# Patient Record
Sex: FEMALE | Race: WHITE | NOT HISPANIC OR LATINO | Employment: UNEMPLOYED | URBAN - METROPOLITAN AREA
[De-identification: names, ages, dates, MRNs, and addresses within clinical notes are randomized per-mention and may not be internally consistent; named-entity substitution may affect disease eponyms.]

---

## 2022-10-11 ENCOUNTER — OFFICE VISIT (OUTPATIENT)
Dept: OBGYN CLINIC | Facility: CLINIC | Age: 22
End: 2022-10-11

## 2022-10-11 VITALS
WEIGHT: 120.2 LBS | BODY MASS INDEX: 20.03 KG/M2 | DIASTOLIC BLOOD PRESSURE: 74 MMHG | SYSTOLIC BLOOD PRESSURE: 112 MMHG | HEIGHT: 65 IN

## 2022-10-11 DIAGNOSIS — Z01.419 GYNECOLOGIC EXAM NORMAL: Primary | ICD-10-CM

## 2022-10-11 DIAGNOSIS — Z30.41 ENCOUNTER FOR SURVEILLANCE OF CONTRACEPTIVE PILLS: ICD-10-CM

## 2022-10-11 PROCEDURE — G0145 SCR C/V CYTO,THINLAYER,RESCR: HCPCS | Performed by: PHYSICIAN ASSISTANT

## 2022-10-11 RX ORDER — NORETHINDRONE ACETATE AND ETHINYL ESTRADIOL 1MG-20(21)
1 KIT ORAL DAILY
Qty: 28 TABLET | Refills: 2 | Status: SHIPPED | OUTPATIENT
Start: 2022-10-11

## 2022-10-11 RX ORDER — LEVONORGESTREL AND ETHINYL ESTRADIOL 0.1-0.02MG
KIT ORAL
COMMUNITY
Start: 2022-03-16 | End: 2022-10-11

## 2022-10-11 NOTE — PROGRESS NOTES
Assessment/Plan   Problem List Items Addressed This Visit        Other    Gynecologic exam normal - Primary     Pap guidelines reviewed  Pap with reflex done today  Reviewed birth control options, would like to switch to a different OCP  Reviewed when to start, what to do if misses pill  Recommend using condoms for the 1st month on the pill, if misses more than 2 pills in the pack, if on antibiotics and for STD prevention  Reviewed common side effects of the pill including nausea, vomiting, breast pain, bloating, fatigue, mood swings, weight gain, and increased acne  Reassured side effects typically diminish in the first month or two on the pill  Reviewed clotting risk and signs and symptoms of pulmonary embolism, DVT, myocardial infarction, stroke  Return to office in 3 months for pill check  Relevant Orders    Liquid-based pap, screening      Other Visit Diagnoses     Encounter for surveillance of contraceptive pills        Relevant Medications    norethindrone-ethinyl estradiol (Loestrin Fe 1/20) 1-20 MG-MCG per tablet          Subjective:     Patient ID: Claire Viera is a 20 y o  y o  female  HPI  21 yo seen for annual exam  Currently on [facility]va OCP, original on [facility] OCP for 6 months with no problems  2 months switched to [facility], menses was coming every 2 weeks (10 days early) and having a lot of mood swings on it  Denies bowel or bladder issues  Recently , monogamous, no STD concerns  Last pap: Reports have always been normal    The following portions of the patient's history were reviewed and updated as appropriate: She  has no past medical history on file  She   Patient Active Problem List    Diagnosis Date Noted   • Gynecologic exam normal 10/11/2022     She  has no past surgical history on file  Her family history is not on file  She  reports that she has never smoked  She has never used smokeless tobacco  She reports current alcohol use   She reports that she does not use drugs  Current Outpatient Medications   Medication Sig Dispense Refill   • norethindrone-ethinyl estradiol (Loestrin Fe ) 1-20 MG-MCG per tablet Take 1 tablet by mouth daily 28 tablet 2     No current facility-administered medications for this visit  She has No Known Allergies       Menstrual History:  OB History             Para        Term                AB        Living   0       SAB        IAB        Ectopic        Multiple        Live Births                      Patient's last menstrual period was 10/02/2022 (approximate)  Review of Systems   Constitutional: Negative for fatigue, fever and unexpected weight change  HENT: Negative for dental problem and sinus pressure  Eyes: Negative for visual disturbance  Respiratory: Negative for cough, shortness of breath and wheezing  Cardiovascular: Negative for chest pain  Gastrointestinal: Negative for abdominal pain, blood in stool, constipation, diarrhea, nausea and vomiting  Endocrine: Negative for polydipsia  Genitourinary: Positive for menstrual problem  Negative for difficulty urinating, dyspareunia, dysuria, frequency, hematuria, pelvic pain and urgency  Musculoskeletal: Negative for arthralgias and back pain  Neurological: Negative for dizziness, seizures, light-headedness and headaches  Psychiatric/Behavioral: Negative for suicidal ideas  The patient is not nervous/anxious  Objective:  Vitals:    10/11/22 1504   BP: 112/74   BP Location: Left arm   Patient Position: Sitting   Cuff Size: Standard   Weight: 54 5 kg (120 lb 3 2 oz)   Height: 5' 5" (1 651 m)      Physical Exam  Constitutional:       Appearance: Normal appearance  She is well-developed  Genitourinary:      Vulva and bladder normal       No lesions in the vagina  Right Labia: No rash, tenderness, lesions or skin changes  Left Labia: No tenderness, lesions, skin changes or rash  No labial fusion noted        No inguinal adenopathy present in the right or left side  No vaginal discharge, erythema, tenderness or bleeding  Right Adnexa: not tender, not full and no mass present  Left Adnexa: not tender, not full and no mass present  No cervical motion tenderness, discharge or lesion  Uterus is not enlarged, tender or irregular  No uterine mass detected  No urethral prolapse, tenderness or mass present  Bladder is not tender  Breasts: Breasts are symmetrical       Right: No swelling, bleeding, inverted nipple, mass, nipple discharge, skin change, tenderness, axillary adenopathy or supraclavicular adenopathy  Left: No swelling, bleeding, inverted nipple, mass, nipple discharge, skin change, tenderness, axillary adenopathy or supraclavicular adenopathy  HENT:      Head: Normocephalic and atraumatic  Neck:      Thyroid: No thyromegaly  Cardiovascular:      Rate and Rhythm: Normal rate and regular rhythm  Heart sounds: Normal heart sounds  No murmur heard  No friction rub  No gallop  Pulmonary:      Effort: Pulmonary effort is normal  No respiratory distress  Breath sounds: Normal breath sounds  No wheezing  Abdominal:      General: There is no distension  Palpations: Abdomen is soft  There is no mass  Tenderness: There is no abdominal tenderness  There is no guarding or rebound  Hernia: No hernia is present  Lymphadenopathy:      Cervical: No cervical adenopathy  Upper Body:      Right upper body: No supraclavicular, axillary or pectoral adenopathy  Left upper body: No supraclavicular, axillary or pectoral adenopathy  Lower Body: No right inguinal adenopathy  No left inguinal adenopathy  Neurological:      Mental Status: She is alert and oriented to person, place, and time  Skin:     General: Skin is warm and dry     Psychiatric:         Behavior: Behavior normal

## 2022-10-11 NOTE — ASSESSMENT & PLAN NOTE
Pap guidelines reviewed  Pap with reflex done today  Reviewed birth control options, would like to switch to a different OCP  Reviewed when to start, what to do if misses pill  Recommend using condoms for the 1st month on the pill, if misses more than 2 pills in the pack, if on antibiotics and for STD prevention  Reviewed common side effects of the pill including nausea, vomiting, breast pain, bloating, fatigue, mood swings, weight gain, and increased acne  Reassured side effects typically diminish in the first month or two on the pill  Reviewed clotting risk and signs and symptoms of pulmonary embolism, DVT, myocardial infarction, stroke  Return to office in 3 months for pill check

## 2022-10-18 LAB
LAB AP GYN PRIMARY INTERPRETATION: NORMAL
LAB AP LMP: NORMAL
Lab: NORMAL

## 2022-11-29 DIAGNOSIS — R45.86 MOOD SWINGS: ICD-10-CM

## 2022-11-29 DIAGNOSIS — R53.83 OTHER FATIGUE: Primary | ICD-10-CM

## 2022-11-30 ENCOUNTER — APPOINTMENT (OUTPATIENT)
Dept: LAB | Facility: CLINIC | Age: 22
End: 2022-11-30

## 2022-11-30 DIAGNOSIS — R53.83 OTHER FATIGUE: ICD-10-CM

## 2022-11-30 DIAGNOSIS — R45.86 MOOD SWINGS: ICD-10-CM

## 2022-11-30 LAB
BASOPHILS # BLD AUTO: 0.03 THOUSANDS/ÂΜL (ref 0–0.1)
BASOPHILS NFR BLD AUTO: 1 % (ref 0–1)
EOSINOPHIL # BLD AUTO: 0.1 THOUSAND/ÂΜL (ref 0–0.61)
EOSINOPHIL NFR BLD AUTO: 2 % (ref 0–6)
ERYTHROCYTE [DISTWIDTH] IN BLOOD BY AUTOMATED COUNT: 11.6 % (ref 11.6–15.1)
HCT VFR BLD AUTO: 39.3 % (ref 34.8–46.1)
HGB BLD-MCNC: 13 G/DL (ref 11.5–15.4)
IMM GRANULOCYTES # BLD AUTO: 0.01 THOUSAND/UL (ref 0–0.2)
IMM GRANULOCYTES NFR BLD AUTO: 0 % (ref 0–2)
LYMPHOCYTES # BLD AUTO: 1.74 THOUSANDS/ÂΜL (ref 0.6–4.47)
LYMPHOCYTES NFR BLD AUTO: 39 % (ref 14–44)
MCH RBC QN AUTO: 28.6 PG (ref 26.8–34.3)
MCHC RBC AUTO-ENTMCNC: 33.1 G/DL (ref 31.4–37.4)
MCV RBC AUTO: 87 FL (ref 82–98)
MONOCYTES # BLD AUTO: 0.37 THOUSAND/ÂΜL (ref 0.17–1.22)
MONOCYTES NFR BLD AUTO: 8 % (ref 4–12)
NEUTROPHILS # BLD AUTO: 2.26 THOUSANDS/ÂΜL (ref 1.85–7.62)
NEUTS SEG NFR BLD AUTO: 50 % (ref 43–75)
NRBC BLD AUTO-RTO: 0 /100 WBCS
PLATELET # BLD AUTO: 292 THOUSANDS/UL (ref 149–390)
PMV BLD AUTO: 11 FL (ref 8.9–12.7)
RBC # BLD AUTO: 4.54 MILLION/UL (ref 3.81–5.12)
WBC # BLD AUTO: 4.51 THOUSAND/UL (ref 4.31–10.16)

## 2022-12-01 LAB
ALBUMIN SERPL BCP-MCNC: 3.9 G/DL (ref 3.5–5)
ALP SERPL-CCNC: 40 U/L (ref 46–116)
ALT SERPL W P-5'-P-CCNC: 18 U/L (ref 12–78)
ANION GAP SERPL CALCULATED.3IONS-SCNC: 4 MMOL/L (ref 4–13)
AST SERPL W P-5'-P-CCNC: 17 U/L (ref 5–45)
BILIRUB SERPL-MCNC: 0.38 MG/DL (ref 0.2–1)
BUN SERPL-MCNC: 12 MG/DL (ref 5–25)
CALCIUM SERPL-MCNC: 9.6 MG/DL (ref 8.3–10.1)
CHLORIDE SERPL-SCNC: 106 MMOL/L (ref 96–108)
CO2 SERPL-SCNC: 26 MMOL/L (ref 21–32)
CREAT SERPL-MCNC: 0.74 MG/DL (ref 0.6–1.3)
FERRITIN SERPL-MCNC: 7 NG/ML (ref 8–388)
FOLATE SERPL-MCNC: >20 NG/ML (ref 3.1–17.5)
GFR SERPL CREATININE-BSD FRML MDRD: 114 ML/MIN/1.73SQ M
GLUCOSE SERPL-MCNC: 92 MG/DL (ref 65–140)
IRON SERPL-MCNC: 68 UG/DL (ref 50–170)
POTASSIUM SERPL-SCNC: 4 MMOL/L (ref 3.5–5.3)
PROT SERPL-MCNC: 7.3 G/DL (ref 6.4–8.4)
SODIUM SERPL-SCNC: 136 MMOL/L (ref 135–147)
T4 FREE SERPL-MCNC: 0.97 NG/DL (ref 0.76–1.46)
TSH SERPL DL<=0.05 MIU/L-ACNC: 2.38 UIU/ML (ref 0.45–4.5)
VIT B12 SERPL-MCNC: 291 PG/ML (ref 100–900)

## 2022-12-10 PROBLEM — Z01.419 GYNECOLOGIC EXAM NORMAL: Status: RESOLVED | Noted: 2022-10-11 | Resolved: 2022-12-10

## 2023-03-24 ENCOUNTER — OFFICE VISIT (OUTPATIENT)
Dept: FAMILY MEDICINE CLINIC | Facility: CLINIC | Age: 23
End: 2023-03-24

## 2023-03-24 VITALS
HEART RATE: 116 BPM | SYSTOLIC BLOOD PRESSURE: 118 MMHG | HEIGHT: 65 IN | WEIGHT: 118.6 LBS | OXYGEN SATURATION: 99 % | BODY MASS INDEX: 19.76 KG/M2 | TEMPERATURE: 99.1 F | RESPIRATION RATE: 15 BRPM | DIASTOLIC BLOOD PRESSURE: 80 MMHG

## 2023-03-24 DIAGNOSIS — Z11.4 SCREENING FOR HIV (HUMAN IMMUNODEFICIENCY VIRUS): ICD-10-CM

## 2023-03-24 DIAGNOSIS — Z00.00 ANNUAL PHYSICAL EXAM: Primary | ICD-10-CM

## 2023-03-24 DIAGNOSIS — F90.2 ATTENTION DEFICIT HYPERACTIVITY DISORDER (ADHD), COMBINED TYPE: ICD-10-CM

## 2023-03-24 DIAGNOSIS — Z13.6 SCREENING FOR CARDIOVASCULAR CONDITION: ICD-10-CM

## 2023-03-24 DIAGNOSIS — Z13.1 SCREENING FOR DIABETES MELLITUS: ICD-10-CM

## 2023-03-24 DIAGNOSIS — Z11.59 NEED FOR HEPATITIS C SCREENING TEST: ICD-10-CM

## 2023-03-24 DIAGNOSIS — Z11.8 SCREENING FOR CHLAMYDIAL DISEASE: ICD-10-CM

## 2023-03-24 DIAGNOSIS — D50.8 IRON DEFICIENCY ANEMIA SECONDARY TO INADEQUATE DIETARY IRON INTAKE: ICD-10-CM

## 2023-03-24 DIAGNOSIS — Z23 ENCOUNTER FOR IMMUNIZATION: ICD-10-CM

## 2023-03-24 PROBLEM — F90.9 ADHD: Status: ACTIVE | Noted: 2023-01-24

## 2023-03-24 RX ORDER — DEXTROAMPHETAMINE SACCHARATE, AMPHETAMINE ASPARTATE MONOHYDRATE, DEXTROAMPHETAMINE SULFATE AND AMPHETAMINE SULFATE 2.5; 2.5; 2.5; 2.5 MG/1; MG/1; MG/1; MG/1
10 CAPSULE, EXTENDED RELEASE ORAL EVERY MORNING
COMMUNITY
Start: 2023-01-24

## 2023-03-24 NOTE — PROGRESS NOTES
[facility]    NAME: Henna Fuentes  AGE: 20 y o   SEX: female  : 2000     DATE: 3/24/2023     Assessment and Plan:     Problem List Items Addressed This Visit        Other    ADHD     Patient started on medication by previously provider  She is experiencing good results with medication and would like to continue  Will monitor closely          Relevant Medications    amphetamine-dextroamphetamine (ADDERALL XR) 10 MG 24 hr capsule    Other Relevant Orders    Lipid panel    Comprehensive metabolic panel    CBC and differential    TSH, 3rd generation with Free T4 reflex    UA (URINE) with reflex to Scope   Other Visit Diagnoses     Annual physical exam    -  Primary    Relevant Orders    Lipid panel    Comprehensive metabolic panel    CBC and differential    TSH, 3rd generation with Free T4 reflex    UA (URINE) with reflex to Scope    : HIV 1/2 AB/AG w Reflex [facility] for 2 yr old and above    Hepatitis C antibody    Chlamydia/GC amplified DNA by PCR    Iron Panel (Includes Ferritin, Iron Sat%, Iron, and TIBC)    Screening for diabetes mellitus        Relevant Orders    Lipid panel    Comprehensive metabolic panel    Screening for cardiovascular condition        Relevant Orders    Lipid panel    Comprehensive metabolic panel    Screening for HIV (human immunodeficiency virus)        Relevant Orders    : HIV 1/2 AB/AG w Reflex [facility] for 2 yr old and above    Need for hepatitis C screening test        Relevant Orders    Hepatitis C antibody    Iron deficiency anemia secondary to inadequate dietary iron intake        Relevant Orders    CBC and differential    Iron Panel (Includes Ferritin, Iron Sat%, Iron, and TIBC)    Screening for chlamydial disease        Relevant Orders    Chlamydia/GC amplified DNA by PCR    Encounter for immunization        Relevant Orders    TDAP VACCINE GREATER THAN OR EQUAL TO 6YO IM (Completed) Immunizations and preventive care screenings were discussed with patient today  Appropriate education was printed on patient's after visit summary  Counseling:  Alcohol/drug use: discussed moderation in alcohol intake, the recommendations for healthy alcohol use, and avoidance of illicit drug use  Dental Health: discussed importance of regular tooth brushing, flossing, and dental visits  Injury prevention: discussed safety/seat belts, safety helmets, smoke detectors, carbon dioxide detectors, and smoking near bedding or upholstery  Sexual health: discussed sexually transmitted diseases, partner selection, use of condoms, avoidance of unintended pregnancy, and contraceptive alternatives  · Exercise: the importance of regular exercise/physical activity was discussed  Recommend exercise 3-5 times per week for at least 30 minutes  Return in about 3 months (around 6/24/2023) for ADHD follow up/medication   Chief Complaint:     Chief Complaint   Patient presents with   • Establish Care     New patient check up      History of Present Illness:     Adult Annual Physical   Patient here for a comprehensive physical exam  She is a new patient   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[employer]  She recently got   Reports she is on adderall and has a dx of adhd  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  Diet and Physical Activity  · Diet/Nutrition: well balanced diet  · Exercise: moderate cardiovascular exercise  Depression Screening  PHQ-2/9 Depression Screening    Little interest or pleasure in doing things: 0 - not at all  Feeling down, depressed, or hopeless: 0 - not at all  PHQ-2 Score: 0  PHQ-2 Interpretation: Negative depression screen         /GYN Health  · Follows with gyn     Review of Systems:     Review of Systems   Constitutional: Negative for chills, fatigue and fever  HENT: Negative for congestion, postnasal drip, rhinorrhea and sinus pressure      Eyes: Negative for photophobia and visual disturbance  Respiratory: Negative for cough and shortness of breath  Cardiovascular: Negative for chest pain, palpitations and leg swelling  Gastrointestinal: Negative for abdominal pain, constipation, diarrhea, nausea and vomiting  Genitourinary: Negative for difficulty urinating and dysuria  Musculoskeletal: Negative for arthralgias and myalgias  Skin: Negative for color change and rash  Neurological: Negative for dizziness, weakness, light-headedness and headaches  Psychiatric/Behavioral: Positive for decreased concentration  Past Medical History:     History reviewed  No pertinent past medical history  Past Surgical History:     History reviewed  No pertinent surgical history  Social History:     Social History     Socioeconomic History   • Marital status: /Civil Union     Spouse name: None   • Number of children: None   • Years of education: None   • Highest education level: None   Occupational History   • None   Tobacco Use   • Smoking status: Never   • Smokeless tobacco: Never   Vaping Use   • Vaping Use: Never used   Substance and Sexual Activity   • Alcohol use: Yes     Comment: socially   • Drug use: Never   • Sexual activity: Yes     Partners: Male     Birth control/protection: OCP   Other Topics Concern   • None   Social History Narrative   • None     Social Determinants of Health     Financial Resource Strain: Not on file   Food Insecurity: Not on file   Transportation Needs: Not on file   Physical Activity: Not on file   Stress: Not on file   Social Connections: Not on file   Intimate Partner Violence: Not At Risk   • Fear of Current or Ex-Partner: No   • Emotionally Abused: No   • Physically Abused: No   • Sexually Abused: No   Housing Stability: Not on file      Family History:     History reviewed  No pertinent family history     Current Medications:     Current Outpatient Medications   Medication Sig Dispense Refill   • amphetamine-dextroamphetamine (ADDERALL XR) 10 MG 24 hr capsule 10 mg every morning       No current facility-administered medications for this visit  Allergies:     No Known Allergies   Physical Exam:     /80   Pulse (!) 116   Temp 99 1 °F (37 3 °C) (Tympanic)   Resp 15   Ht 5' 4 6" (1 641 m)   Wt 53 8 kg (118 lb 9 6 oz)   LMP 02/23/2023   SpO2 99%   BMI 19 98 kg/m²     Physical Exam  Constitutional:       General: She is not in acute distress  Appearance: Normal appearance  She is not ill-appearing, toxic-appearing or diaphoretic  HENT:      Head: Normocephalic and atraumatic  Right Ear: Tympanic membrane and ear canal normal       Left Ear: Tympanic membrane and ear canal normal       Nose: Nose normal  No congestion  Mouth/Throat:      Mouth: Mucous membranes are moist       Pharynx: Oropharynx is clear  No oropharyngeal exudate  Eyes:      Extraocular Movements: Extraocular movements intact  Conjunctiva/sclera: Conjunctivae normal       Pupils: Pupils are equal, round, and reactive to light  Cardiovascular:      Rate and Rhythm: Normal rate and regular rhythm  Pulses: Normal pulses  Heart sounds: No murmur heard  Pulmonary:      Effort: Pulmonary effort is normal       Breath sounds: Normal breath sounds  No wheezing, rhonchi or rales  Abdominal:      General: Bowel sounds are normal  There is no distension  Palpations: Abdomen is soft  Tenderness: There is no abdominal tenderness  Musculoskeletal:         General: No swelling or tenderness  Normal range of motion  Cervical back: Normal range of motion and neck supple  Skin:     General: Skin is warm and dry  Capillary Refill: Capillary refill takes less than 2 seconds  Neurological:      General: No focal deficit present  Mental Status: She is alert and oriented to person, place, and time  Cranial Nerves: No cranial nerve deficit     Psychiatric:         Mood and Affect: Mood normal          Behavior: Behavior normal          Thought Content:  Thought content normal           Rocio Maria DO   [facility]

## 2023-03-24 NOTE — ASSESSMENT & PLAN NOTE
Patient started on medication by previously provider  She is experiencing good results with medication and would like to continue  Will monitor closely

## 2023-03-31 ENCOUNTER — APPOINTMENT (OUTPATIENT)
Dept: LAB | Facility: CLINIC | Age: 23
End: 2023-03-31

## 2023-03-31 DIAGNOSIS — Z11.4 SCREENING FOR HIV (HUMAN IMMUNODEFICIENCY VIRUS): ICD-10-CM

## 2023-03-31 DIAGNOSIS — F90.2 ATTENTION DEFICIT HYPERACTIVITY DISORDER (ADHD), COMBINED TYPE: ICD-10-CM

## 2023-03-31 DIAGNOSIS — Z13.1 SCREENING FOR DIABETES MELLITUS: ICD-10-CM

## 2023-03-31 DIAGNOSIS — Z00.00 ANNUAL PHYSICAL EXAM: ICD-10-CM

## 2023-03-31 DIAGNOSIS — Z13.6 SCREENING FOR CARDIOVASCULAR CONDITION: ICD-10-CM

## 2023-03-31 DIAGNOSIS — Z11.8 SCREENING FOR CHLAMYDIAL DISEASE: ICD-10-CM

## 2023-03-31 DIAGNOSIS — Z11.59 NEED FOR HEPATITIS C SCREENING TEST: ICD-10-CM

## 2023-03-31 DIAGNOSIS — D50.8 IRON DEFICIENCY ANEMIA SECONDARY TO INADEQUATE DIETARY IRON INTAKE: ICD-10-CM

## 2023-03-31 LAB
BASOPHILS # BLD AUTO: 0.04 THOUSANDS/ÂΜL (ref 0–0.1)
BASOPHILS NFR BLD AUTO: 1 % (ref 0–1)
CHOLEST SERPL-MCNC: 183 MG/DL
EOSINOPHIL # BLD AUTO: 0.05 THOUSAND/ÂΜL (ref 0–0.61)
EOSINOPHIL NFR BLD AUTO: 1 % (ref 0–6)
ERYTHROCYTE [DISTWIDTH] IN BLOOD BY AUTOMATED COUNT: 11.7 % (ref 11.6–15.1)
HCT VFR BLD AUTO: 39 % (ref 34.8–46.1)
HDLC SERPL-MCNC: 59 MG/DL
HGB BLD-MCNC: 13.1 G/DL (ref 11.5–15.4)
IMM GRANULOCYTES # BLD AUTO: 0.01 THOUSAND/UL (ref 0–0.2)
IMM GRANULOCYTES NFR BLD AUTO: 0 % (ref 0–2)
LDLC SERPL CALC-MCNC: 110 MG/DL (ref 0–100)
LYMPHOCYTES # BLD AUTO: 1.48 THOUSANDS/ÂΜL (ref 0.6–4.47)
LYMPHOCYTES NFR BLD AUTO: 40 % (ref 14–44)
MCH RBC QN AUTO: 29.4 PG (ref 26.8–34.3)
MCHC RBC AUTO-ENTMCNC: 33.6 G/DL (ref 31.4–37.4)
MCV RBC AUTO: 88 FL (ref 82–98)
MONOCYTES # BLD AUTO: 0.25 THOUSAND/ÂΜL (ref 0.17–1.22)
MONOCYTES NFR BLD AUTO: 7 % (ref 4–12)
NEUTROPHILS # BLD AUTO: 1.85 THOUSANDS/ÂΜL (ref 1.85–7.62)
NEUTS SEG NFR BLD AUTO: 51 % (ref 43–75)
NONHDLC SERPL-MCNC: 124 MG/DL
NRBC BLD AUTO-RTO: 0 /100 WBCS
PLATELET # BLD AUTO: 274 THOUSANDS/UL (ref 149–390)
PMV BLD AUTO: 11.3 FL (ref 8.9–12.7)
RBC # BLD AUTO: 4.45 MILLION/UL (ref 3.81–5.12)
TRIGL SERPL-MCNC: 68 MG/DL
WBC # BLD AUTO: 3.68 THOUSAND/UL (ref 4.31–10.16)

## 2023-04-01 LAB
ALBUMIN SERPL BCP-MCNC: 4.1 G/DL (ref 3.5–5)
ALP SERPL-CCNC: 40 U/L (ref 46–116)
ALT SERPL W P-5'-P-CCNC: 20 U/L (ref 12–78)
ANION GAP SERPL CALCULATED.3IONS-SCNC: 1 MMOL/L (ref 4–13)
AST SERPL W P-5'-P-CCNC: 17 U/L (ref 5–45)
BACTERIA UR QL AUTO: ABNORMAL /HPF
BILIRUB SERPL-MCNC: 0.76 MG/DL (ref 0.2–1)
BILIRUB UR QL STRIP: NEGATIVE
BUN SERPL-MCNC: 11 MG/DL (ref 5–25)
CALCIUM SERPL-MCNC: 9.3 MG/DL (ref 8.3–10.1)
CHLORIDE SERPL-SCNC: 108 MMOL/L (ref 96–108)
CLARITY UR: CLEAR
CO2 SERPL-SCNC: 28 MMOL/L (ref 21–32)
COLOR UR: COLORLESS
CREAT SERPL-MCNC: 0.74 MG/DL (ref 0.6–1.3)
FERRITIN SERPL-MCNC: 19 NG/ML (ref 8–388)
GFR SERPL CREATININE-BSD FRML MDRD: 114 ML/MIN/1.73SQ M
GLUCOSE P FAST SERPL-MCNC: 80 MG/DL (ref 65–99)
GLUCOSE UR STRIP-MCNC: NEGATIVE MG/DL
HCV AB SER QL: NORMAL
HGB UR QL STRIP.AUTO: ABNORMAL
HIV 1+2 AB+HIV1 P24 AG SERPL QL IA: NORMAL
HIV 2 AB SERPL QL IA: NORMAL
HIV1 AB SERPL QL IA: NORMAL
HIV1 P24 AG SERPL QL IA: NORMAL
IRON SATN MFR SERPL: 35 % (ref 15–50)
IRON SERPL-MCNC: 117 UG/DL (ref 50–170)
KETONES UR STRIP-MCNC: NEGATIVE MG/DL
LEUKOCYTE ESTERASE UR QL STRIP: ABNORMAL
NITRITE UR QL STRIP: NEGATIVE
NON-SQ EPI CELLS URNS QL MICRO: ABNORMAL /HPF
PH UR STRIP.AUTO: 7 [PH]
POTASSIUM SERPL-SCNC: 4 MMOL/L (ref 3.5–5.3)
PROT SERPL-MCNC: 7.3 G/DL (ref 6.4–8.4)
PROT UR STRIP-MCNC: NEGATIVE MG/DL
RBC #/AREA URNS AUTO: ABNORMAL /HPF
SODIUM SERPL-SCNC: 137 MMOL/L (ref 135–147)
SP GR UR STRIP.AUTO: 1 (ref 1–1.03)
TIBC SERPL-MCNC: 333 UG/DL (ref 250–450)
TSH SERPL DL<=0.05 MIU/L-ACNC: 1.29 UIU/ML (ref 0.45–4.5)
UROBILINOGEN UR STRIP-ACNC: <2 MG/DL
WBC #/AREA URNS AUTO: ABNORMAL /HPF

## 2023-04-03 LAB
C TRACH DNA SPEC QL NAA+PROBE: NEGATIVE
N GONORRHOEA DNA SPEC QL NAA+PROBE: NEGATIVE

## 2023-04-07 DIAGNOSIS — F90.2 ATTENTION DEFICIT HYPERACTIVITY DISORDER (ADHD), COMBINED TYPE: Primary | ICD-10-CM

## 2023-04-07 RX ORDER — DEXTROAMPHETAMINE SACCHARATE, AMPHETAMINE ASPARTATE MONOHYDRATE, DEXTROAMPHETAMINE SULFATE AND AMPHETAMINE SULFATE 2.5; 2.5; 2.5; 2.5 MG/1; MG/1; MG/1; MG/1
10 CAPSULE, EXTENDED RELEASE ORAL EVERY MORNING
Qty: 30 CAPSULE | Refills: 0 | Status: SHIPPED | OUTPATIENT
Start: 2023-04-07

## 2023-04-07 NOTE — TELEPHONE ENCOUNTER
Pt reports she was able to locate a pharmacy that has [facility] in stock  [facility]

## 2023-05-30 DIAGNOSIS — F90.2 ATTENTION DEFICIT HYPERACTIVITY DISORDER (ADHD), COMBINED TYPE: ICD-10-CM

## 2023-05-30 RX ORDER — DEXTROAMPHETAMINE SACCHARATE, AMPHETAMINE ASPARTATE MONOHYDRATE, DEXTROAMPHETAMINE SULFATE AND AMPHETAMINE SULFATE 2.5; 2.5; 2.5; 2.5 MG/1; MG/1; MG/1; MG/1
10 CAPSULE, EXTENDED RELEASE ORAL EVERY MORNING
Qty: 30 CAPSULE | Refills: 0 | Status: SHIPPED | OUTPATIENT
Start: 2023-05-30

## 2023-06-30 ENCOUNTER — OFFICE VISIT (OUTPATIENT)
Dept: FAMILY MEDICINE CLINIC | Facility: CLINIC | Age: 23
End: 2023-06-30
Payer: COMMERCIAL

## 2023-06-30 VITALS
OXYGEN SATURATION: 98 % | DIASTOLIC BLOOD PRESSURE: 78 MMHG | TEMPERATURE: 99.2 F | HEART RATE: 104 BPM | HEIGHT: 65 IN | RESPIRATION RATE: 17 BRPM | WEIGHT: 116.2 LBS | SYSTOLIC BLOOD PRESSURE: 136 MMHG | BODY MASS INDEX: 19.36 KG/M2

## 2023-06-30 DIAGNOSIS — F90.2 ATTENTION DEFICIT HYPERACTIVITY DISORDER (ADHD), COMBINED TYPE: Primary | ICD-10-CM

## 2023-06-30 PROCEDURE — 99214 OFFICE O/P EST MOD 30 MIN: CPT | Performed by: FAMILY MEDICINE

## 2023-06-30 NOTE — PROGRESS NOTES
Perry Galvan 2000 female MRN: [id number]    Family Medicine Follow-up Visit    ASSESSMENT/PLAN  Problem List Items Addressed This Visit        Other    ADHD - Primary       Doing well on medication as prescribed  Labs stable  No concerns  Follow up for CP when due or sooner if needed          No future appointments  SUBJECTIVE  CC: ADHD (3 month follow up  Meds working well  )      HPI:  Perry Galvan is a 24 y o  female who presents for check up  She reports her ADHD is doing well taking her medication  Denies any side effects  HPI    Review of Systems   Constitutional: Negative for chills, fatigue and fever  HENT: Negative for congestion, postnasal drip, rhinorrhea and sinus pressure  Eyes: Negative for photophobia and visual disturbance  Respiratory: Negative for cough and shortness of breath  Cardiovascular: Negative for chest pain, palpitations and leg swelling  Gastrointestinal: Negative for abdominal pain, constipation, diarrhea, nausea and vomiting  Genitourinary: Negative for difficulty urinating and dysuria  Musculoskeletal: Negative for arthralgias and myalgias  Skin: Negative for color change and rash  Neurological: Negative for dizziness, weakness, light-headedness and headaches  Historical Information   The patient history was reviewed as follows:    No past medical history on file  No past surgical history on file    Family History   Problem Relation Age of Onset   • Breast cancer Paternal Grandmother       Social History   Social History     Substance and Sexual Activity   Alcohol Use Yes   • Alcohol/week: 1 0 standard drink of alcohol   • Types: 1 Standard drinks or equivalent per week    Comment: Occasional drinking in a social setting     Social History     Substance and Sexual Activity   Drug Use Never     Social History     Tobacco Use   Smoking Status Never   Smokeless Tobacco Never       Medications:     Current Outpatient Medications:   • "amphetamine-dextroamphetamine (ADDERALL XR) 10 MG 24 hr capsule, Take 1 capsule (10 mg total) by mouth every morning Max Daily Amount: 10 mg, Disp: 30 capsule, Rfl: 0  No Known Allergies    OBJECTIVE    Vitals:   Vitals:    06/30/23 1111   BP: 136/78   BP Location: Left arm   Patient Position: Sitting   Cuff Size: Standard   Pulse: 104   Resp: 17   Temp: 99 2 °F (37 3 °C)   TempSrc: Tympanic   SpO2: 98%   Weight: 52 7 kg (116 lb 3 2 oz)   Height: 5' 4 6\" (1 641 m)           Physical Exam  Constitutional:       Appearance: She is well-developed  HENT:      Head: Normocephalic and atraumatic  Eyes:      Extraocular Movements: Extraocular movements intact  Conjunctiva/sclera: Conjunctivae normal    Cardiovascular:      Rate and Rhythm: Normal rate and regular rhythm  Heart sounds: Normal heart sounds  Pulmonary:      Effort: Pulmonary effort is normal  No respiratory distress  Breath sounds: Normal breath sounds  No wheezing  Musculoskeletal:         General: No tenderness  Normal range of motion  Cervical back: Normal range of motion and neck supple  Skin:     General: Skin is warm and dry  Neurological:      Mental Status: She is alert and oriented to person, place, and time     Psychiatric:         Mood and Affect: Mood normal          Behavior: Behavior normal             Labs:        Doy Dancer, DO    6/30/2023      "

## 2023-07-21 DIAGNOSIS — F90.2 ATTENTION DEFICIT HYPERACTIVITY DISORDER (ADHD), COMBINED TYPE: ICD-10-CM

## 2023-07-21 RX ORDER — DEXTROAMPHETAMINE SACCHARATE, AMPHETAMINE ASPARTATE MONOHYDRATE, DEXTROAMPHETAMINE SULFATE AND AMPHETAMINE SULFATE 2.5; 2.5; 2.5; 2.5 MG/1; MG/1; MG/1; MG/1
10 CAPSULE, EXTENDED RELEASE ORAL EVERY MORNING
Qty: 30 CAPSULE | Refills: 0 | Status: SHIPPED | OUTPATIENT
Start: 2023-07-21

## 2023-07-21 NOTE — TELEPHONE ENCOUNTER
I will fill this prescription.   However, patient needs to complete controlled substance agreement as per [facility]. [facility]'s policy

## 2023-09-15 DIAGNOSIS — F90.2 ATTENTION DEFICIT HYPERACTIVITY DISORDER (ADHD), COMBINED TYPE: ICD-10-CM

## 2023-09-15 RX ORDER — DEXTROAMPHETAMINE SACCHARATE, AMPHETAMINE ASPARTATE MONOHYDRATE, DEXTROAMPHETAMINE SULFATE AND AMPHETAMINE SULFATE 2.5; 2.5; 2.5; 2.5 MG/1; MG/1; MG/1; MG/1
10 CAPSULE, EXTENDED RELEASE ORAL EVERY MORNING
Qty: 30 CAPSULE | Refills: 0 | Status: SHIPPED | OUTPATIENT
Start: 2023-09-15

## 2023-12-01 DIAGNOSIS — F90.2 ATTENTION DEFICIT HYPERACTIVITY DISORDER (ADHD), COMBINED TYPE: ICD-10-CM

## 2023-12-04 RX ORDER — DEXTROAMPHETAMINE SACCHARATE, AMPHETAMINE ASPARTATE MONOHYDRATE, DEXTROAMPHETAMINE SULFATE AND AMPHETAMINE SULFATE 2.5; 2.5; 2.5; 2.5 MG/1; MG/1; MG/1; MG/1
10 CAPSULE, EXTENDED RELEASE ORAL EVERY MORNING
Qty: 30 CAPSULE | Refills: 0 | Status: SHIPPED | OUTPATIENT
Start: 2023-12-04

## 2023-12-19 ENCOUNTER — ANNUAL EXAM (OUTPATIENT)
Dept: OBGYN CLINIC | Facility: CLINIC | Age: 23
End: 2023-12-19
Payer: COMMERCIAL

## 2023-12-19 VITALS
BODY MASS INDEX: 18.83 KG/M2 | WEIGHT: 113 LBS | SYSTOLIC BLOOD PRESSURE: 114 MMHG | DIASTOLIC BLOOD PRESSURE: 66 MMHG | HEIGHT: 65 IN

## 2023-12-19 DIAGNOSIS — Z01.419 GYNECOLOGIC EXAM NORMAL: Primary | ICD-10-CM

## 2023-12-19 PROCEDURE — S0612 ANNUAL GYNECOLOGICAL EXAMINA: HCPCS | Performed by: PHYSICIAN ASSISTANT

## 2023-12-19 NOTE — PROGRESS NOTES
Assessment/Plan   Problem List Items Addressed This Visit          Other    Gynecologic exam normal - Primary     Reviewed recommendation to start a prenatal vitamin with folic acid 400-800mcg 1-3 months prior to conception to decrease risk of neural tube defects such as spina bifida.   Reviewed pap guidelines. Pap deferred secondary to negative pap in .   Return to office for annual or as needed.             Subjective:     Patient ID: Bianca Hernández is a 23 y.o. y.o. female.    HPI  24 yo seen for annual exam. Patient stopped OCP secondary to bad side effects. At this time ok if pregnancy occurs. Denies bowel or bladder issues.   Last pap: 10/11/2022 NILM  The following portions of the patient's history were reviewed and updated as appropriate: She  has no past medical history on file.  She   Patient Active Problem List    Diagnosis Date Noted    Gynecologic exam normal 2023    ADHD 2023     She  has no past surgical history on file.  Her family history includes Breast cancer in her paternal grandmother.  She  reports that she has never smoked. She has never used smokeless tobacco. She reports current alcohol use of about 1.0 standard drink of alcohol per week. She reports that she does not use drugs.  Current Outpatient Medications   Medication Sig Dispense Refill    amphetamine-dextroamphetamine (ADDERALL XR) 10 MG 24 hr capsule Take 1 capsule (10 mg total) by mouth every morning Max Daily Amount: 10 mg 30 capsule 0     No current facility-administered medications for this visit.     She has No Known Allergies..    Menstrual History:  OB History          0    Para   0    Term   0       0    AB   0    Living   0         SAB   0    IAB   0    Ectopic   0    Multiple   0    Live Births   0                  Patient's last menstrual period was 2023 (approximate).         Review of Systems   Constitutional:  Negative for fatigue, fever and unexpected weight change.   HENT:   "Negative for dental problem and sinus pressure.    Eyes:  Negative for visual disturbance.   Respiratory:  Negative for cough, shortness of breath and wheezing.    Cardiovascular:  Negative for chest pain.   Gastrointestinal:  Negative for abdominal pain, blood in stool, constipation, diarrhea, nausea and vomiting.   Endocrine: Negative for polydipsia.   Genitourinary:  Negative for difficulty urinating, dyspareunia, dysuria, frequency, hematuria, pelvic pain and urgency.   Musculoskeletal:  Negative for arthralgias and back pain.   Neurological:  Negative for dizziness, seizures, light-headedness and headaches.   Psychiatric/Behavioral:  Negative for suicidal ideas. The patient is not nervous/anxious.        Objective:  Vitals:    12/19/23 0952   BP: 114/66   BP Location: Left arm   Patient Position: Sitting   Cuff Size: Standard   Weight: 51.3 kg (113 lb)   Height: 5' 4.6\" (1.641 m)      Physical Exam  Constitutional:       Appearance: Normal appearance. She is well-developed.   Genitourinary:      Vulva and bladder normal.      No lesions in the vagina.      Right Labia: No rash, tenderness, lesions or skin changes.     Left Labia: No tenderness, lesions, skin changes or rash.     No labial fusion noted.      No inguinal adenopathy present in the right or left side.     No vaginal discharge, erythema, tenderness or bleeding.        Right Adnexa: not tender, not full and no mass present.     Left Adnexa: not tender, not full and no mass present.     No cervical motion tenderness, discharge or lesion.      Uterus is not enlarged, tender or irregular.      No uterine mass detected.     No urethral prolapse, tenderness or mass present.      Bladder is not tender.    Breasts:     Breasts are symmetrical.      Right: No swelling, bleeding, inverted nipple, mass, nipple discharge, skin change or tenderness.      Left: No swelling, bleeding, inverted nipple, mass, nipple discharge, skin change or tenderness.   HENT:      " Head: Normocephalic and atraumatic.   Neck:      Thyroid: No thyromegaly.   Cardiovascular:      Rate and Rhythm: Normal rate and regular rhythm.      Heart sounds: Normal heart sounds. No murmur heard.     No friction rub. No gallop.   Pulmonary:      Effort: Pulmonary effort is normal. No respiratory distress.      Breath sounds: Normal breath sounds. No wheezing.   Abdominal:      General: There is no distension.      Palpations: Abdomen is soft. There is no mass.      Tenderness: There is no abdominal tenderness. There is no guarding or rebound.      Hernia: No hernia is present.   Lymphadenopathy:      Cervical: No cervical adenopathy.      Upper Body:      Right upper body: No supraclavicular, axillary or pectoral adenopathy.      Left upper body: No supraclavicular, axillary or pectoral adenopathy.      Lower Body: No right inguinal adenopathy. No left inguinal adenopathy.   Neurological:      Mental Status: She is alert and oriented to person, place, and time.   Skin:     General: Skin is warm and dry.   Psychiatric:         Behavior: Behavior normal.

## 2023-12-19 NOTE — PATIENT INSTRUCTIONS
Reviewed recommendation to start a prenatal vitamin with folic acid 400-800mcg 1-3 months prior to conception to decrease risk of neural tube defects such as spina bifida.

## 2023-12-19 NOTE — ASSESSMENT & PLAN NOTE
Reviewed recommendation to start a prenatal vitamin with folic acid 400-800mcg 1-3 months prior to conception to decrease risk of neural tube defects such as spina bifida.   Reviewed pap guidelines. Pap deferred secondary to negative pap in 2022.   Return to office for annual or as needed.

## 2024-01-24 DIAGNOSIS — F90.2 ATTENTION DEFICIT HYPERACTIVITY DISORDER (ADHD), COMBINED TYPE: ICD-10-CM

## 2024-01-24 RX ORDER — DEXTROAMPHETAMINE SACCHARATE, AMPHETAMINE ASPARTATE MONOHYDRATE, DEXTROAMPHETAMINE SULFATE AND AMPHETAMINE SULFATE 2.5; 2.5; 2.5; 2.5 MG/1; MG/1; MG/1; MG/1
10 CAPSULE, EXTENDED RELEASE ORAL EVERY MORNING
Qty: 30 CAPSULE | Refills: 0 | Status: SHIPPED | OUTPATIENT
Start: 2024-01-24

## 2024-02-17 PROBLEM — Z01.419 GYNECOLOGIC EXAM NORMAL: Status: RESOLVED | Noted: 2023-12-19 | Resolved: 2024-02-17

## 2024-03-12 DIAGNOSIS — F90.2 ATTENTION DEFICIT HYPERACTIVITY DISORDER (ADHD), COMBINED TYPE: ICD-10-CM

## 2024-03-12 RX ORDER — DEXTROAMPHETAMINE SACCHARATE, AMPHETAMINE ASPARTATE MONOHYDRATE, DEXTROAMPHETAMINE SULFATE AND AMPHETAMINE SULFATE 2.5; 2.5; 2.5; 2.5 MG/1; MG/1; MG/1; MG/1
10 CAPSULE, EXTENDED RELEASE ORAL EVERY MORNING
Qty: 30 CAPSULE | Refills: 0 | Status: SHIPPED | OUTPATIENT
Start: 2024-03-12

## 2024-04-16 ENCOUNTER — OFFICE VISIT (OUTPATIENT)
Dept: FAMILY MEDICINE CLINIC | Facility: CLINIC | Age: 24
End: 2024-04-16
Payer: COMMERCIAL

## 2024-04-16 VITALS
HEIGHT: 65 IN | SYSTOLIC BLOOD PRESSURE: 128 MMHG | WEIGHT: 115.8 LBS | HEART RATE: 102 BPM | DIASTOLIC BLOOD PRESSURE: 78 MMHG | BODY MASS INDEX: 19.29 KG/M2 | TEMPERATURE: 99.1 F | OXYGEN SATURATION: 100 % | RESPIRATION RATE: 16 BRPM

## 2024-04-16 DIAGNOSIS — Z13.1 SCREENING FOR DIABETES MELLITUS: ICD-10-CM

## 2024-04-16 DIAGNOSIS — F90.2 ATTENTION DEFICIT HYPERACTIVITY DISORDER (ADHD), COMBINED TYPE: ICD-10-CM

## 2024-04-16 DIAGNOSIS — Z00.00 ANNUAL PHYSICAL EXAM: Primary | ICD-10-CM

## 2024-04-16 DIAGNOSIS — Z79.899 CONTROLLED SUBSTANCE AGREEMENT SIGNED: ICD-10-CM

## 2024-04-16 DIAGNOSIS — Z13.6 SCREENING FOR CARDIOVASCULAR CONDITION: ICD-10-CM

## 2024-04-16 PROCEDURE — 99395 PREV VISIT EST AGE 18-39: CPT | Performed by: FAMILY MEDICINE

## 2024-04-16 RX ORDER — DEXTROAMPHETAMINE SACCHARATE, AMPHETAMINE ASPARTATE MONOHYDRATE, DEXTROAMPHETAMINE SULFATE AND AMPHETAMINE SULFATE 2.5; 2.5; 2.5; 2.5 MG/1; MG/1; MG/1; MG/1
10 CAPSULE, EXTENDED RELEASE ORAL EVERY MORNING
Qty: 30 CAPSULE | Refills: 0 | Status: SHIPPED | OUTPATIENT
Start: 2024-04-16

## 2024-04-16 NOTE — ASSESSMENT & PLAN NOTE
She is well controlled on adderall 10 mg daily  PDMP reviewed  Controlled substance agreement signed

## 2024-04-16 NOTE — PROGRESS NOTES
ADULT ANNUAL PHYSICAL  [facility] PRACT[facility]    NAME: Bianca Hernández  AGE: 23 y.o. SEX: female  : 2000     DATE: 2024     Assessment and Plan:     Problem List Items Addressed This Visit          Behavioral Health    ADHD     She is well controlled on adderall 10 mg daily  PDMP reviewed  Controlled substance agreement signed         Relevant Medications    amphetamine-dextroamphetamine (ADDERALL XR) 10 MG 24 hr capsule    Other Relevant Orders    Lipid panel    Comprehensive metabolic panel    CBC and differential    TSH, 3rd generation with Free T4 reflex    UA (URINE) with reflex to Scope    Iron Panel (Includes Ferritin, Iron Sat%, Iron, and TIBC)       Other    Controlled substance agreement signed     Other Visit Diagnoses       Annual physical exam    -  Primary    Relevant Orders    Lipid panel    Comprehensive metabolic panel    CBC and differential    TSH, 3rd generation with Free T4 reflex    UA (URINE) with reflex to Scope    Iron Panel (Includes Ferritin, Iron Sat%, Iron, and TIBC)    Screening for diabetes mellitus        Relevant Orders    Comprehensive metabolic panel    Screening for cardiovascular condition        Relevant Orders    Lipid panel            Immunizations and preventive care screenings were discussed with patient today. Appropriate education was printed on patient's after visit summary.    Counseling:  Alcohol/drug use: discussed moderation in alcohol intake, the recommendations for healthy alcohol use, and avoidance of illicit drug use.  Dental Health: discussed importance of regular tooth brushing, flossing, and dental visits.  Injury prevention: discussed safety/seat belts, safety helmets, smoke detectors, carbon dioxide detectors, and smoking near bedding or upholstery.  Sexual health: discussed sexually transmitted diseases, partner selection, use of condoms, avoidance of unintended pregnancy, and contraceptive  alternatives.  Exercise: the importance of regular exercise/physical activity was discussed. Recommend exercise 3-5 times per week for at least 30 minutes.          Return in about 6 months (around 10/16/2024) for medication check.     Chief Complaint:     Chief Complaint   Patient presents with    Annual Exam      History of Present Illness:     Adult Annual Physical   Patient here for a comprehensive physical exam. The patient reports no problems.    Diet and Physical Activity  Diet/Nutrition: well balanced diet.   Exercise: moderate cardiovascular exercise.      Depression Screening  PHQ-2/9 Depression Screening    Little interest or pleasure in doing things: 0 - not at all  Feeling down, depressed, or hopeless: 0 - not at all  PHQ-2 Score: 0  PHQ-2 Interpretation: Negative depression screen         /GYN Health  Follows with gyn        Review of Systems:     Review of Systems   Constitutional:  Negative for chills, fatigue and fever.   HENT:  Negative for congestion, postnasal drip, rhinorrhea and sinus pressure.    Eyes:  Negative for photophobia and visual disturbance.   Respiratory:  Negative for cough and shortness of breath.    Cardiovascular:  Negative for chest pain, palpitations and leg swelling.   Gastrointestinal:  Negative for abdominal pain, constipation, diarrhea, nausea and vomiting.   Genitourinary:  Negative for difficulty urinating and dysuria.   Musculoskeletal:  Negative for arthralgias and myalgias.   Skin:  Negative for color change and rash.   Neurological:  Negative for dizziness, weakness, light-headedness and headaches.      Past Medical History:     History reviewed. No pertinent past medical history.   Past Surgical History:     History reviewed. No pertinent surgical history.   Social History:     Social History     Socioeconomic History    Marital status: /Civil Union     Spouse name: None    Number of children: None    Years of education: None    Highest education level:  "None   Occupational History    None   Tobacco Use    Smoking status: Never    Smokeless tobacco: Never   Vaping Use    Vaping status: Never Used   Substance and Sexual Activity    Alcohol use: Yes     Alcohol/week: 1.0 standard drink of alcohol     Types: 1 Standard drinks or equivalent per week     Comment: Occasional drinking in a social setting    Drug use: Never    Sexual activity: Yes     Partners: Male     Birth control/protection: Condom, Condom Male   Other Topics Concern    None   Social History Narrative    None     Social Determinants of Health     Financial Resource Strain: Not on file   Food Insecurity: Not on file   Transportation Needs: Not on file   Physical Activity: Not on file   Stress: Not on file   Social Connections: Not on file   Intimate Partner Violence: Not At Risk (3/24/2023)    Humiliation, Afraid, Rape, and Kick questionnaire     Fear of Current or Ex-Partner: No     Emotionally Abused: No     Physically Abused: No     Sexually Abused: No   Housing Stability: Not on file      Family History:     Family History   Problem Relation Age of Onset    Breast cancer Paternal Grandmother       Current Medications:     Current Outpatient Medications   Medication Sig Dispense Refill    amphetamine-dextroamphetamine (ADDERALL XR) 10 MG 24 hr capsule Take 1 capsule (10 mg total) by mouth every morning Max Daily Amount: 10 mg 30 capsule 0     No current facility-administered medications for this visit.      Allergies:     No Known Allergies   Physical Exam:     /78 (BP Location: Left arm, Patient Position: Sitting, Cuff Size: Standard)   Pulse 102   Temp 99.1 °F (37.3 °C) (Tympanic)   Resp 16   Ht 5' 4.5\" (1.638 m)   Wt 52.5 kg (115 lb 12.8 oz)   LMP 04/15/2024 (Exact Date)   SpO2 100%   BMI 19.57 kg/m²     Physical Exam  Constitutional:       General: She is not in acute distress.     Appearance: Normal appearance. She is not ill-appearing, toxic-appearing or diaphoretic.   HENT:      " Head: Normocephalic and atraumatic.      Right Ear: Tympanic membrane and ear canal normal.      Left Ear: Tympanic membrane and ear canal normal.      Nose: Nose normal. No congestion.      Mouth/Throat:      Mouth: Mucous membranes are moist.      Pharynx: Oropharynx is clear. No oropharyngeal exudate.   Eyes:      Extraocular Movements: Extraocular movements intact.      Conjunctiva/sclera: Conjunctivae normal.      Pupils: Pupils are equal, round, and reactive to light.   Cardiovascular:      Rate and Rhythm: Normal rate and regular rhythm.      Pulses: Normal pulses.      Heart sounds: No murmur heard.  Pulmonary:      Effort: Pulmonary effort is normal.      Breath sounds: Normal breath sounds. No wheezing, rhonchi or rales.   Abdominal:      General: Bowel sounds are normal. There is no distension.      Palpations: Abdomen is soft.      Tenderness: There is no abdominal tenderness.   Musculoskeletal:         General: No swelling or tenderness. Normal range of motion.      Cervical back: Normal range of motion and neck supple.   Skin:     General: Skin is warm and dry.      Capillary Refill: Capillary refill takes less than 2 seconds.   Neurological:      General: No focal deficit present.      Mental Status: She is alert and oriented to person, place, and time.      Cranial Nerves: No cranial nerve deficit.   Psychiatric:         Mood and Affect: Mood normal.         Behavior: Behavior normal.         Thought Content: Thought content normal.          Columba Ramírez DO   [facility]

## 2024-04-17 ENCOUNTER — APPOINTMENT (OUTPATIENT)
Dept: LAB | Facility: CLINIC | Age: 24
End: 2024-04-17
Payer: COMMERCIAL

## 2024-04-17 DIAGNOSIS — Z00.00 ANNUAL PHYSICAL EXAM: ICD-10-CM

## 2024-04-17 DIAGNOSIS — Z13.1 SCREENING FOR DIABETES MELLITUS: ICD-10-CM

## 2024-04-17 DIAGNOSIS — F90.2 ATTENTION DEFICIT HYPERACTIVITY DISORDER (ADHD), COMBINED TYPE: ICD-10-CM

## 2024-04-17 DIAGNOSIS — Z13.6 SCREENING FOR CARDIOVASCULAR CONDITION: ICD-10-CM

## 2024-04-17 LAB
ALBUMIN SERPL BCP-MCNC: 4.3 G/DL (ref 3.5–5)
ALP SERPL-CCNC: 43 U/L (ref 34–104)
ALT SERPL W P-5'-P-CCNC: 10 U/L (ref 7–52)
ANION GAP SERPL CALCULATED.3IONS-SCNC: 10 MMOL/L (ref 4–13)
AST SERPL W P-5'-P-CCNC: 17 U/L (ref 13–39)
BACTERIA UR QL AUTO: NORMAL /HPF
BASOPHILS # BLD AUTO: 0.05 THOUSANDS/ÂΜL (ref 0–0.1)
BASOPHILS NFR BLD AUTO: 1 % (ref 0–1)
BILIRUB SERPL-MCNC: 0.65 MG/DL (ref 0.2–1)
BILIRUB UR QL STRIP: NEGATIVE
BUN SERPL-MCNC: 11 MG/DL (ref 5–25)
CALCIUM SERPL-MCNC: 9 MG/DL (ref 8.4–10.2)
CHLORIDE SERPL-SCNC: 106 MMOL/L (ref 96–108)
CHOLEST SERPL-MCNC: 161 MG/DL
CLARITY UR: CLEAR
CO2 SERPL-SCNC: 25 MMOL/L (ref 21–32)
COLOR UR: COLORLESS
CREAT SERPL-MCNC: 0.67 MG/DL (ref 0.6–1.3)
EOSINOPHIL # BLD AUTO: 0.05 THOUSAND/ÂΜL (ref 0–0.61)
EOSINOPHIL NFR BLD AUTO: 1 % (ref 0–6)
ERYTHROCYTE [DISTWIDTH] IN BLOOD BY AUTOMATED COUNT: 11.9 % (ref 11.6–15.1)
FERRITIN SERPL-MCNC: 13 NG/ML (ref 11–307)
GFR SERPL CREATININE-BSD FRML MDRD: 123 ML/MIN/1.73SQ M
GLUCOSE P FAST SERPL-MCNC: 92 MG/DL (ref 65–99)
GLUCOSE UR STRIP-MCNC: NEGATIVE MG/DL
HCT VFR BLD AUTO: 36.7 % (ref 34.8–46.1)
HDLC SERPL-MCNC: 57 MG/DL
HGB BLD-MCNC: 12.3 G/DL (ref 11.5–15.4)
HGB UR QL STRIP.AUTO: ABNORMAL
IMM GRANULOCYTES # BLD AUTO: 0.01 THOUSAND/UL (ref 0–0.2)
IMM GRANULOCYTES NFR BLD AUTO: 0 % (ref 0–2)
IRON SATN MFR SERPL: 25 % (ref 15–50)
IRON SERPL-MCNC: 87 UG/DL (ref 50–212)
KETONES UR STRIP-MCNC: NEGATIVE MG/DL
LDLC SERPL CALC-MCNC: 93 MG/DL (ref 0–100)
LEUKOCYTE ESTERASE UR QL STRIP: NEGATIVE
LYMPHOCYTES # BLD AUTO: 1.66 THOUSANDS/ÂΜL (ref 0.6–4.47)
LYMPHOCYTES NFR BLD AUTO: 48 % (ref 14–44)
MCH RBC QN AUTO: 29.2 PG (ref 26.8–34.3)
MCHC RBC AUTO-ENTMCNC: 33.5 G/DL (ref 31.4–37.4)
MCV RBC AUTO: 87 FL (ref 82–98)
MONOCYTES # BLD AUTO: 0.26 THOUSAND/ÂΜL (ref 0.17–1.22)
MONOCYTES NFR BLD AUTO: 7 % (ref 4–12)
NEUTROPHILS # BLD AUTO: 1.51 THOUSANDS/ÂΜL (ref 1.85–7.62)
NEUTS SEG NFR BLD AUTO: 43 % (ref 43–75)
NITRITE UR QL STRIP: NEGATIVE
NON-SQ EPI CELLS URNS QL MICRO: NORMAL /HPF
NONHDLC SERPL-MCNC: 104 MG/DL
NRBC BLD AUTO-RTO: 0 /100 WBCS
PH UR STRIP.AUTO: 6.5 [PH]
PLATELET # BLD AUTO: 245 THOUSANDS/UL (ref 149–390)
PMV BLD AUTO: 11.1 FL (ref 8.9–12.7)
POTASSIUM SERPL-SCNC: 3.8 MMOL/L (ref 3.5–5.3)
PROT SERPL-MCNC: 6.7 G/DL (ref 6.4–8.4)
PROT UR STRIP-MCNC: NEGATIVE MG/DL
RBC # BLD AUTO: 4.21 MILLION/UL (ref 3.81–5.12)
RBC #/AREA URNS AUTO: NORMAL /HPF
SODIUM SERPL-SCNC: 141 MMOL/L (ref 135–147)
SP GR UR STRIP.AUTO: 1.01 (ref 1–1.03)
TIBC SERPL-MCNC: 348 UG/DL (ref 250–450)
TRIGL SERPL-MCNC: 57 MG/DL
TSH SERPL DL<=0.05 MIU/L-ACNC: 1.63 UIU/ML (ref 0.45–4.5)
UIBC SERPL-MCNC: 261 UG/DL (ref 155–355)
UROBILINOGEN UR STRIP-ACNC: <2 MG/DL
WBC # BLD AUTO: 3.54 THOUSAND/UL (ref 4.31–10.16)
WBC #/AREA URNS AUTO: NORMAL /HPF

## 2024-04-17 PROCEDURE — 82728 ASSAY OF FERRITIN: CPT

## 2024-04-17 PROCEDURE — 85025 COMPLETE CBC W/AUTO DIFF WBC: CPT

## 2024-04-17 PROCEDURE — 83540 ASSAY OF IRON: CPT

## 2024-04-17 PROCEDURE — 81001 URINALYSIS AUTO W/SCOPE: CPT

## 2024-04-17 PROCEDURE — 83550 IRON BINDING TEST: CPT

## 2024-04-17 PROCEDURE — 80053 COMPREHEN METABOLIC PANEL: CPT

## 2024-04-17 PROCEDURE — 36415 COLL VENOUS BLD VENIPUNCTURE: CPT

## 2024-04-17 PROCEDURE — 84443 ASSAY THYROID STIM HORMONE: CPT

## 2024-04-17 PROCEDURE — 80061 LIPID PANEL: CPT

## 2024-04-23 DIAGNOSIS — D50.8 IRON DEFICIENCY ANEMIA SECONDARY TO INADEQUATE DIETARY IRON INTAKE: Primary | ICD-10-CM

## 2024-06-14 DIAGNOSIS — F90.2 ATTENTION DEFICIT HYPERACTIVITY DISORDER (ADHD), COMBINED TYPE: ICD-10-CM

## 2024-06-17 RX ORDER — DEXTROAMPHETAMINE SACCHARATE, AMPHETAMINE ASPARTATE MONOHYDRATE, DEXTROAMPHETAMINE SULFATE AND AMPHETAMINE SULFATE 2.5; 2.5; 2.5; 2.5 MG/1; MG/1; MG/1; MG/1
10 CAPSULE, EXTENDED RELEASE ORAL EVERY MORNING
Qty: 30 CAPSULE | Refills: 0 | Status: SHIPPED | OUTPATIENT
Start: 2024-06-17

## 2024-09-10 ENCOUNTER — ULTRASOUND (OUTPATIENT)
Dept: OBGYN CLINIC | Facility: CLINIC | Age: 24
End: 2024-09-10
Payer: COMMERCIAL

## 2024-09-10 VITALS
BODY MASS INDEX: 19.33 KG/M2 | WEIGHT: 116 LBS | DIASTOLIC BLOOD PRESSURE: 60 MMHG | HEIGHT: 65 IN | SYSTOLIC BLOOD PRESSURE: 134 MMHG

## 2024-09-10 DIAGNOSIS — Z3A.08 8 WEEKS GESTATION OF PREGNANCY: Primary | ICD-10-CM

## 2024-09-10 DIAGNOSIS — O36.80X0 PREGNANCY WITH UNCERTAIN FETAL VIABILITY, SINGLE OR UNSPECIFIED FETUS: ICD-10-CM

## 2024-09-10 PROCEDURE — 99213 OFFICE O/P EST LOW 20 MIN: CPT | Performed by: OBSTETRICS & GYNECOLOGY

## 2024-09-10 PROCEDURE — 76817 TRANSVAGINAL US OBSTETRIC: CPT | Performed by: OBSTETRICS & GYNECOLOGY

## 2024-09-10 NOTE — PROGRESS NOTES
"Pregnancy Confirmation Visit  [facility]  [address] [place] [address]    Assessment/Plan:  24 y.o.  presenting with missed menses.  Viable pregnancy 8w3d by LMP consistent with ultrasound today.  - Continue/start prenatal vitamin  - We reviewed her current medications and discussed which are safe to continue in pregnancy  - We briefly discussed options for aneuploidy screening, to be discussed further at the prenatal intake  - Schedule prenatal intake with RN and initial prenatal visit; prenatal labs will be ordered during the prenatal intake      Subjective:    CC: Missed period    Bianca Hernández is a 24 y.o.  who presents with missed menses.  Patient's last menstrual period was 2024.    Patient notes that this pregnancy was planned and desired.  She was not using contraception at the time of conception. She reports she is certain of her LMP and that she has regular menses. She has has no vaginal bleeding since her LMP.    Objective:  /60 (BP Location: Left arm, Patient Position: Sitting, Cuff Size: Standard)   Ht 5' 4.5\" (1.638 m)   Wt 52.6 kg (116 lb)   LMP 2024   BMI 19.60 kg/m²     Physical Exam:  General: Well appearing, no distress  CV: Regular rate  Respiratory: Unlabored breathing  Abdomen: Soft, nontender  Extremities: Without edema  Mood and Affect: Appropriate    Transvaginal Pelvic Ultrasound  Hawk IUP  Yolk sac: Present  Fetal Pole: Present  CRL consistent with EGA   Cardiac activity: Present   bpm  No adnexal masses appreciated   "

## 2024-09-11 ENCOUNTER — TELEPHONE (OUTPATIENT)
Age: 24
End: 2024-09-11

## 2024-09-19 NOTE — PATIENT INSTRUCTIONS
Mindi Hernández,     It was so nice getting to know you today. Remember if you have an urgent or time sensitive concern, please call the practice phone number so a clinical triage team member can review your symptoms and get in touch with our on call provider if necessary. If you have general questions or need help navigating our services please REPLY to this message so it comes directly to me and I will respond between other patients. If I am out of the office for any reason, another nurse navigator may reach out to help you. Our Pregnancy Essential Guide is a great online resource--please use the link below.     [facility] Pregnancy Essentials Guide  [facility] ([email or web address])     Again, Congratulations and Thank You for choosing [facility]. I look forward to helping you through this journey.

## 2024-09-19 NOTE — PROGRESS NOTES
OB INTAKE INTERVIEW  Patient is 24 y.o. who presents for OB intake at 10.2 wks  She is accompanied by  during this encounter  The father of her baby (Alex) is involved in the pregnancy and is 29 years old.      Last Menstrual Period: 2024 (Exact)  Reports her cycles are regular, every 28 days   Ultrasound: Measured 8 weeks 2 days on 09/10/2024  Estimated Date of Delivery: 2024 dating based on LMP     Signs/Symptoms of Pregnancy  Current pregnancy symptoms:   Nausea, no vomiting- Recommended to try to eat 5-6 small meals a day, increase protein with meals/snacks, in order to keep stomach full at all times. Try bland foods like plain crackers, toast as well as carbonated drinks like gingerale. Hard peppermint or ale candy, is a natural treatment for nausea,  B- pops  with B6 ( available at the pharmacy), B6 25 mg in the AM and 25 mg in PM. May combine with Unisom 12.5 mg at night. Unisom may cause drowsiness.  High complex carbohydrate snack  before bedtime. Constipation no  Headaches YES, occasional   Cramping/spotting YES, intermittent cramping, no spotting   PICA cravings no    Diabetes-  There is no height or weight on file to calculate BMI.  If patient has 1 or more, please order early 1 hour GTT  History of GDM no  BMI >35 no  History of PCOS or current metformin use no  History of LGA/macrosomic infant (4000g/9lbs) no    If patient has 2 or more, please order early 1 hour GTT  BMI>30 no  AMA no  First degree relative with type 2 diabetes no  History of chronic HTN, hyperlipidemia, elevated A1C no  High risk race (, , ,  or ) no    Hypertension- if you answer yes to any of the following, please order baseline preeclampsia labs (cbc, comprehensive metabolic panel, urine protein creatinine ratio, uric acid)  History of of chronic HTN no  History of gestational HTN no  History of preeclampsia, eclampsia, or HELLP syndrome  no  History of diabetes no  History of lupus, autoimmune disease, kidney disease no    Thyroid- if yes order TSH with reflex T4  History of thyroid disease no    Bleeding Disorder or Hx of DVT-patient or first degree relative with history of. Order the following if not done previously.   (Factor V, antithrombin III, prothrombin gene mutation, protein C and S Ag, lupus anticoagulant, anticardiolipin, beta-2 glycoprotein)   no    OB/GYN-  History of abnormal pap smear no       Date of last pap smear 10/11/2022-NILM  History of HPV no  History of Herpes/HSV no  History of other STI (gonorrhea, chlamydia, trich) no  History of prior  no  History of prior  no  History of  delivery prior to 36 weeks 6 days no  History of blood transfusion no  Ok for blood transfusion yes    Substance screening-   History of tobacco use no  Currently using tobacco no  Substance Use Screen Level No Risk     MRSA Screening-   Does the pt have a hx of MRSA? no    Immunizations:  Influenza vaccine given this season No  Discussed Tdap vaccine yes  Discussed COVID Vaccine yes    Genetic/MFM-  Do you or your partner have a history of any of the following in yourselves or first degree relatives?  Cystic fibrosis no  Spinal muscular atrophy no  Hemoglobinopathy/Sickle Cell/Thalassemia no  Fragile X Intellectual Disability no    If yes, discuss Carrier Screening and recommend consultation with MFM/Genetic Counseling and place specific [facility] Referral for.    If no, discuss Carrier Screening being completed once in a lifetime as a standard of care lab test. Place orders for Cystic Fibrosis Gene Test (GIV187) and Spinal Muscular Atrophy DNA ([id number])    Declined CF/SMA screening   Appointment for Nuchal Translucency Ultrasound at [facility] scheduled for  10/7/2024      Interview education  [facility] Pregnancy Essentials Book reviewed, discussed and attached to their AVS yes    Ambulatory Referral to  placed  EPDS:  0    Prenatal lab work scripts YES  Preferred Lab: [facility]   Extra labs ordered:  none    Aspirin/Preeclampsia Screen    Risk Level Risk Factor Recommendation   LOW Prior Uncomplicated full-term delivery no No Aspirin recommendation        MODERATE Nulliparity YES Recommend low-dose aspirin if     BMI>30 no 2 or more moderate risk factors    Family History Preeclampsia (mother/sister) no     35yr old or greater no     Black Race, Concern for SDOH/Low Socioeconomic no     IVF Pregnancy  no     Personal History Risks (low birth weight, prior adverse preg outcome, >10yr preg interval) no         HIGH History of Preeclampsia no Recommend low-dose aspirin if     Multifetal gestation no 1 or more high risk factors    Chronic HTN no     Type 1 or 2 Diabetes no     Renal Disease no     Autoimmune Disease  no      Contraindications to ASA therapy:  NSAID/ ASA allergy: no  Nasal polyps: no  Asthma with history of ASA induced bronchospasm: no  Relative contraindications:  History of GI bleed: no  Active peptic ulcer disease: no  Severe hepatic dysfunction: no    Patient should be recommended to take ASA 162mg during this pregnancy from 12-36wks to lower her risk of preeclampsia:   Low Risk      The patient has a history now or in prior pregnancy notable for:  Primigravida       Details that I feel the provider should be aware of:   Leslie is a current patient of [facility]. This is her first pregnancy. This is a planned and desired pregnancy.     Past Medical history includes:  ADHD-Stopped taking  Adderall 4 months ago. Follows PCP         PN1 visit scheduled. The patient was oriented to our practice, the navigator role, reviewed delivering physicians and [facility] for Delivery. All questions were answered.    Interviewed by: TRISTEN [place]

## 2024-09-23 ENCOUNTER — PATIENT OUTREACH (OUTPATIENT)
Dept: OBGYN CLINIC | Facility: CLINIC | Age: 24
End: 2024-09-23

## 2024-09-23 ENCOUNTER — INITIAL PRENATAL (OUTPATIENT)
Dept: OBGYN CLINIC | Facility: CLINIC | Age: 24
End: 2024-09-23

## 2024-09-23 VITALS
HEIGHT: 65 IN | WEIGHT: 116 LBS | BODY MASS INDEX: 19.33 KG/M2 | SYSTOLIC BLOOD PRESSURE: 118 MMHG | DIASTOLIC BLOOD PRESSURE: 60 MMHG

## 2024-09-23 DIAGNOSIS — Z31.430 ENCOUNTER OF FEMALE FOR TESTING FOR GENETIC DISEASE CARRIER STATUS FOR PROCREATIVE MANAGEMENT: ICD-10-CM

## 2024-09-23 DIAGNOSIS — Z34.01 ENCOUNTER FOR SUPERVISION OF NORMAL FIRST PREGNANCY IN FIRST TRIMESTER: Primary | ICD-10-CM

## 2024-09-23 DIAGNOSIS — Z3A.10 10 WEEKS GESTATION OF PREGNANCY: ICD-10-CM

## 2024-09-23 PROCEDURE — OBC: Performed by: NURSE PRACTITIONER

## 2024-09-23 NOTE — PROGRESS NOTES
SW referred for initial prenatal assessment. Patient is , [id number] with an ANTONI of 25. Patient presented with FOB ( Nelson) today, agreeable to speaking with SW.    Patient reports this is a planned pregnancy. She and FOB both drive. Patient is currently unemployed, FOB works full-time. Patient denies needing information for MA/WIC/SNAP, parenting education, or baby supply resources today.    Patient denies current or h/o substance use, DV/IPV, CYS involvement, and legal concerns. Reports h/o ADHD - was on medication through PCP but stopped for pregnancy. Denies interest in therapy at this time. Has good support from FOB, family, and friends. Enjoys watching TV, baking, and cooking for stress relief.    No SW needs identified at this time, SW closing referral. Please re-refer as needed.

## 2024-09-24 ENCOUNTER — APPOINTMENT (OUTPATIENT)
Dept: LAB | Facility: CLINIC | Age: 24
End: 2024-09-24
Payer: COMMERCIAL

## 2024-09-24 DIAGNOSIS — Z34.01 ENCOUNTER FOR SUPERVISION OF NORMAL FIRST PREGNANCY IN FIRST TRIMESTER: ICD-10-CM

## 2024-09-24 LAB
ABO GROUP BLD: NORMAL
BASOPHILS # BLD AUTO: 0.03 THOUSANDS/ΜL (ref 0–0.1)
BASOPHILS NFR BLD AUTO: 1 % (ref 0–1)
BILIRUB UR QL STRIP: NEGATIVE
BLD GP AB SCN SERPL QL: NEGATIVE
CLARITY UR: CLEAR
COLOR UR: NORMAL
EOSINOPHIL # BLD AUTO: 0.04 THOUSAND/ΜL (ref 0–0.61)
EOSINOPHIL NFR BLD AUTO: 1 % (ref 0–6)
ERYTHROCYTE [DISTWIDTH] IN BLOOD BY AUTOMATED COUNT: 12 % (ref 11.6–15.1)
GLUCOSE UR STRIP-MCNC: NEGATIVE MG/DL
HBV SURFACE AB SER-ACNC: 5.58 MIU/ML
HBV SURFACE AG SER QL: NORMAL
HCT VFR BLD AUTO: 34.1 % (ref 34.8–46.1)
HCV AB SER QL: NORMAL
HGB BLD-MCNC: 12.2 G/DL (ref 11.5–15.4)
HGB UR QL STRIP.AUTO: NEGATIVE
IMM GRANULOCYTES # BLD AUTO: 0.03 THOUSAND/UL (ref 0–0.2)
IMM GRANULOCYTES NFR BLD AUTO: 1 % (ref 0–2)
KETONES UR STRIP-MCNC: NEGATIVE MG/DL
LEUKOCYTE ESTERASE UR QL STRIP: NEGATIVE
LYMPHOCYTES # BLD AUTO: 1.44 THOUSANDS/ΜL (ref 0.6–4.47)
LYMPHOCYTES NFR BLD AUTO: 25 % (ref 14–44)
MCH RBC QN AUTO: 30.3 PG (ref 26.8–34.3)
MCHC RBC AUTO-ENTMCNC: 35.8 G/DL (ref 31.4–37.4)
MCV RBC AUTO: 85 FL (ref 82–98)
MONOCYTES # BLD AUTO: 0.43 THOUSAND/ΜL (ref 0.17–1.22)
MONOCYTES NFR BLD AUTO: 8 % (ref 4–12)
NEUTROPHILS # BLD AUTO: 3.69 THOUSANDS/ΜL (ref 1.85–7.62)
NEUTS SEG NFR BLD AUTO: 64 % (ref 43–75)
NITRITE UR QL STRIP: NEGATIVE
NRBC BLD AUTO-RTO: 0 /100 WBCS
PH UR STRIP.AUTO: 6.5 [PH]
PLATELET # BLD AUTO: 253 THOUSANDS/UL (ref 149–390)
PMV BLD AUTO: 11.5 FL (ref 8.9–12.7)
PROT UR STRIP-MCNC: NEGATIVE MG/DL
RBC # BLD AUTO: 4.02 MILLION/UL (ref 3.81–5.12)
RH BLD: POSITIVE
SP GR UR STRIP.AUTO: 1.01 (ref 1–1.03)
SPECIMEN EXPIRATION DATE: NORMAL
TREPONEMA PALLIDUM IGG+IGM AB [PRESENCE] IN SERUM OR PLASMA BY IMMUNOASSAY: NORMAL
UROBILINOGEN UR STRIP-ACNC: <2 MG/DL
WBC # BLD AUTO: 5.66 THOUSAND/UL (ref 4.31–10.16)

## 2024-09-24 PROCEDURE — 85025 COMPLETE CBC W/AUTO DIFF WBC: CPT

## 2024-09-24 PROCEDURE — 36415 COLL VENOUS BLD VENIPUNCTURE: CPT

## 2024-09-24 PROCEDURE — 86780 TREPONEMA PALLIDUM: CPT

## 2024-09-24 PROCEDURE — 86803 HEPATITIS C AB TEST: CPT

## 2024-09-24 PROCEDURE — 87340 HEPATITIS B SURFACE AG IA: CPT

## 2024-09-24 PROCEDURE — 81003 URINALYSIS AUTO W/O SCOPE: CPT

## 2024-09-24 PROCEDURE — 86850 RBC ANTIBODY SCREEN: CPT

## 2024-09-24 PROCEDURE — 86900 BLOOD TYPING SEROLOGIC ABO: CPT

## 2024-09-24 PROCEDURE — 87086 URINE CULTURE/COLONY COUNT: CPT

## 2024-09-24 PROCEDURE — 86762 RUBELLA ANTIBODY: CPT

## 2024-09-24 PROCEDURE — 86706 HEP B SURFACE ANTIBODY: CPT

## 2024-09-24 PROCEDURE — 87389 HIV-1 AG W/HIV-1&-2 AB AG IA: CPT

## 2024-09-24 PROCEDURE — 86901 BLOOD TYPING SEROLOGIC RH(D): CPT

## 2024-09-25 LAB
BACTERIA UR CULT: NORMAL
HIV 1+2 AB+HIV1 P24 AG SERPL QL IA: NORMAL
HIV 2 AB SERPL QL IA: NORMAL
HIV1 AB SERPL QL IA: NORMAL
HIV1 P24 AG SERPL QL IA: NORMAL
RUBV IGG SERPL IA-ACNC: 55.7 IU/ML

## 2024-10-07 ENCOUNTER — ROUTINE PRENATAL (OUTPATIENT)
Dept: PERINATAL CARE | Facility: OTHER | Age: 24
End: 2024-10-07
Payer: COMMERCIAL

## 2024-10-07 VITALS
DIASTOLIC BLOOD PRESSURE: 78 MMHG | SYSTOLIC BLOOD PRESSURE: 118 MMHG | HEART RATE: 106 BPM | HEIGHT: 65 IN | BODY MASS INDEX: 19.36 KG/M2 | WEIGHT: 116.2 LBS

## 2024-10-07 DIAGNOSIS — Z36.82 ENCOUNTER FOR ANTENATAL SCREENING FOR NUCHAL TRANSLUCENCY: Primary | ICD-10-CM

## 2024-10-07 DIAGNOSIS — Z3A.12 12 WEEKS GESTATION OF PREGNANCY: ICD-10-CM

## 2024-10-07 DIAGNOSIS — Z33.1 PREGNANT STATE, INCIDENTAL: ICD-10-CM

## 2024-10-07 DIAGNOSIS — Z36.0 ENCOUNTER FOR ANTENATAL SCREENING FOR CHROMOSOMAL ANOMALIES: ICD-10-CM

## 2024-10-07 DIAGNOSIS — Z3A.08 8 WEEKS GESTATION OF PREGNANCY: ICD-10-CM

## 2024-10-07 PROCEDURE — 36415 COLL VENOUS BLD VENIPUNCTURE: CPT | Performed by: OBSTETRICS & GYNECOLOGY

## 2024-10-07 PROCEDURE — 99243 OFF/OP CNSLTJ NEW/EST LOW 30: CPT | Performed by: OBSTETRICS & GYNECOLOGY

## 2024-10-07 PROCEDURE — 76813 OB US NUCHAL MEAS 1 GEST: CPT | Performed by: OBSTETRICS & GYNECOLOGY

## 2024-10-07 NOTE — PROGRESS NOTES
Patient chose to have [facility] [id number] Non-Invasive Prenatal Screen [id number] PLUS, NO SCA, WITH fetal sex  (per pt request).  Patient choose to be billed through insurance.     Patient given brochure and is aware [facility] will contact patient's insurance and coordinate coverage.  Provided [facility] contact information. General inquiries [phone], Cost estimates [phone] and [facility] Billing [phone]. Website [email or web address].     Blood collection tubes labeled with patient identifiers (name, medical record number, and date of birth).     Filled out [facility] order form. Patient chose to have blood drawn in our office at time of visit. NIPS was drawn from left arm with a butterfly needle by A Qian RAMON. .      If patient chose to have blood work drawn at a [facility] lab we requested patient notify MFM (via phone call or Savor message) when blood collected so office can follow up on results.       Maternal Fetal Medicine will have results in approximately 5-7 business days and will call patient or notify via Savor.  Patient aware viewing lab result online will reveal fetal sex if ordered.    Patient verbalized understanding of all instructions and no questions at this time.

## 2024-10-07 NOTE — LETTER
"   Date: 10/7/2024    Adilson Key V, DO  [address] [place] [address]  [address]  [facility] [id number]    Patient: Bianca Hernández   YOB: 2000   Date of Visit: 10/7/2024   Gestational age 12w5d   Nature of this communication: Routine       This patient was seen recently in our  office.  Please see ultrasound report under \"OB Procedures\" tab.  Please don't hesitate to contact our office with any concerns or questions.      Sincerely,      Savannah Freed MD  Attending Physician, Maternal-Fetal Medicine  [facility]      "

## 2024-10-10 NOTE — PROGRESS NOTES
"[facility]: Ms. Hernández was seen today for nuchal translucency ultrasound.  See ultrasound report under \"OB Procedures\" tab.      MDM:   I. Diagnoses/Problems addressed:  Self limited or minor problem: uncomplicated pregnancy  II.  Data: I ordered the following tests: NIPS.  III.  Risk of morbidity: Low    Please don't hesitate to contact our office with any concerns or questions.  -Savannah Freed MD      "

## 2024-10-11 LAB
CFDNA.FET/CFDNA.TOTAL SFR FETUS: NORMAL %
CITATION REF LAB TEST: NORMAL
FET 13+18+21+X+Y ANEUP PLAS.CFDNA: NEGATIVE
FET CHR 21 TS PLAS.CFDNA QL: NEGATIVE
FET CHR 21 TS PLAS.CFDNA QL: NEGATIVE
FET SEX PLAS.CFDNA DOSAGE CFDNA: NORMAL
FET TS 13 RISK PLAS.CFDNA QL: NEGATIVE
GA EST FROM CONCEPTION DATE: NORMAL D
GESTATIONAL AGE > 9:: YES
LAB DIRECTOR NAME PROVIDER: NORMAL
LAB DIRECTOR NAME PROVIDER: NORMAL
LABORATORY COMMENT REPORT: NORMAL
LIMITATIONS OF THE TEST: NORMAL
NEGATIVE PREDICTIVE VALUE: NORMAL
PERFORMANCE CHARACTERISTICS: NORMAL
POSITIVE PREDICTIVE VALUE: NORMAL
REF LAB TEST METHOD: NORMAL
SL AMB NOTE:: NORMAL
TEST PERFORMANCE INFO SPEC: NORMAL

## 2024-10-15 ENCOUNTER — INITIAL PRENATAL (OUTPATIENT)
Dept: OBGYN CLINIC | Facility: CLINIC | Age: 24
End: 2024-10-15
Payer: COMMERCIAL

## 2024-10-15 VITALS
WEIGHT: 118 LBS | SYSTOLIC BLOOD PRESSURE: 132 MMHG | DIASTOLIC BLOOD PRESSURE: 68 MMHG | BODY MASS INDEX: 19.66 KG/M2 | HEIGHT: 65 IN

## 2024-10-15 DIAGNOSIS — Z3A.13 13 WEEKS GESTATION OF PREGNANCY: Primary | ICD-10-CM

## 2024-10-15 DIAGNOSIS — Z36.1 NEED FOR MATERNAL SERUM ALPHA-PROTEIN (MSAFP) SCREENING: ICD-10-CM

## 2024-10-15 LAB
SL AMB  POCT GLUCOSE, UA: NORMAL
SL AMB POCT URINE PROTEIN: NORMAL

## 2024-10-15 PROCEDURE — PNV: Performed by: NURSE PRACTITIONER

## 2024-10-15 PROCEDURE — 87491 CHLMYD TRACH DNA AMP PROBE: CPT | Performed by: NURSE PRACTITIONER

## 2024-10-15 PROCEDURE — 81002 URINALYSIS NONAUTO W/O SCOPE: CPT | Performed by: NURSE PRACTITIONER

## 2024-10-15 PROCEDURE — 87591 N.GONORRHOEAE DNA AMP PROB: CPT | Performed by: NURSE PRACTITIONER

## 2024-10-15 NOTE — PROGRESS NOTES
"Routine Prenatal Visit  [facility] [place]  [address], [place] [address]    Assessment/Plan:  Bianca is a 24 y.o. year old  at 13w3d who presents for routine prenatal visit.     1. 13 weeks gestation of pregnancy  Assessment & Plan:  Reviewed normal prenatal labs, NIPT, NT  Discussed round ligament pain vs warning signs/when to call  Discussed AFP screening, timing/when to schedule  Anatomy scan scheduled with [facility]  Orders:  -     POCT urine dip  -     Chlamydia/GC amplified DNA by PCR  2. Need for maternal serum alpha-protein (MSAFP) screening  -     Alpha fetoprotein, maternal; Future      Next OB Visit 4 weeks.    Subjective:     CC: Prenatal care    Bianca Hernández is a 24 y.o.  female who presents for routine prenatal care at 13w3d. Feels well, no complaints. Mild nausea that is improving, no vomiting.   Pregnancy ROS: no leakage of fluid, pelvic pain, or vaginal bleeding.  No fetal movement yet    The following portions of the patient's history were reviewed and updated as appropriate: allergies, current medications, past family history, past medical history, obstetric history, gynecologic history, past social history, past surgical history and problem list.      Objective:  /68 (BP Location: Left arm, Patient Position: Sitting, Cuff Size: Standard)   Ht 5' 4.5\" (1.638 m)   Wt 53.5 kg (118 lb)   LMP 2024 (Exact Date)   BMI 19.94 kg/m²   Pregravid Weight/BMI: 52.6 kg (116 lb) (BMI 19.61)  Current Weight: 53.5 kg (118 lb)   Total Weight Gain: 0.907 kg (2 lb)   Pre-Tsering Vitals      Flowsheet Row Most Recent Value   Prenatal Assessment    Fetal Heart Rate 164   Prenatal Vitals    Blood Pressure 132/68   Weight - Scale 53.5 kg (118 lb)   Urine Albumin/Glucose    Dilation/Effacement/Station    Cervical Dilation 0   Cervical Effacement 0   Vaginal Drainage    Draining Fluid No   Edema    LLE Edema None   RLE Edema None   Facial Edema None           Prenatal physical " complete.  General: Well appearing, no distress  Abdomen: Soft, gravid, nontender  Extremities: Non tender.

## 2024-10-15 NOTE — ASSESSMENT & PLAN NOTE
Reviewed normal prenatal labs, NIPT, NT  Discussed round ligament pain vs warning signs/when to call  Discussed AFP screening, timing/when to schedule  Anatomy scan scheduled with M

## 2024-10-15 NOTE — PATIENT INSTRUCTIONS
Please go to the lab for your AFP screening for neural tube defect between 16-18 weeks  Call the office with any concerns

## 2024-10-16 LAB
C TRACH DNA SPEC QL NAA+PROBE: NEGATIVE
N GONORRHOEA DNA SPEC QL NAA+PROBE: NEGATIVE

## 2024-11-12 ENCOUNTER — ROUTINE PRENATAL (OUTPATIENT)
Dept: OBGYN CLINIC | Facility: CLINIC | Age: 24
End: 2024-11-12
Payer: COMMERCIAL

## 2024-11-12 VITALS
DIASTOLIC BLOOD PRESSURE: 70 MMHG | HEIGHT: 65 IN | SYSTOLIC BLOOD PRESSURE: 112 MMHG | WEIGHT: 122 LBS | BODY MASS INDEX: 20.33 KG/M2

## 2024-11-12 DIAGNOSIS — Z34.02 ENCOUNTER FOR SUPERVISION OF NORMAL FIRST PREGNANCY IN SECOND TRIMESTER: Primary | ICD-10-CM

## 2024-11-12 DIAGNOSIS — Z3A.17 17 WEEKS GESTATION OF PREGNANCY: ICD-10-CM

## 2024-11-12 PROBLEM — Z34.00 SUPERVISION OF NORMAL FIRST PREGNANCY: Status: ACTIVE | Noted: 2024-11-12

## 2024-11-12 LAB
SL AMB  POCT GLUCOSE, UA: NORMAL
SL AMB POCT URINE PROTEIN: NORMAL

## 2024-11-12 PROCEDURE — PNV: Performed by: STUDENT IN AN ORGANIZED HEALTH CARE EDUCATION/TRAINING PROGRAM

## 2024-11-12 PROCEDURE — 81002 URINALYSIS NONAUTO W/O SCOPE: CPT | Performed by: STUDENT IN AN ORGANIZED HEALTH CARE EDUCATION/TRAINING PROGRAM

## 2024-11-12 NOTE — ASSESSMENT & PLAN NOTE
- Continue prenatal vitamin with folic acid.  - Aneuploidy screening low risk.  - Screening for open neural tube defects reviewed. Order for maternal serum alpha-fetoprotein previously provided.  - Level II anatomy ultrasound scheduled with Maternal Fetal Medicine.  - Problem list updated, results console reviewed and updated with pertinent prenatal labs.  - PMH, PSH, medications reviewed and updated as needed.  - Return to office in 4 wk(s) for routine prenatal care.  Orders:    POCT urine dip

## 2024-11-12 NOTE — PROGRESS NOTES
"Routine Prenatal Visit  [facility] [place]  [address] [place] [address]  Assessment & Plan  Encounter for supervision of normal first pregnancy in second trimester         17 weeks gestation of pregnancy  - Continue prenatal vitamin with folic acid.  - Aneuploidy screening low risk.  - Screening for open neural tube defects reviewed. Order for maternal serum alpha-fetoprotein previously provided.  - Level II anatomy ultrasound scheduled with Maternal Fetal Medicine.  - Problem list updated, results console reviewed and updated with pertinent prenatal labs.  - PMH, PSH, medications reviewed and updated as needed.  - Return to office in 4 wk(s) for routine prenatal care.  Orders:    POCT urine dip    Subjective:   Bianca Hernández is a 24 y.o.  who presents for routine prenatal care at 17w3d.  Complaints today: None  LOF: No; VB: No; Contractions: No    Objective:  /70 (BP Location: Left arm, Patient Position: Sitting, Cuff Size: Standard)   Ht 5' 4.5\" (1.638 m)   Wt 55.3 kg (122 lb)   LMP 2024 (Exact Date)   BMI 20.62 kg/m²     General: Well appearing, no distress  Respiratory: Unlabored breathing  Cardiovascular: Regular rate.  Abdomen: Soft, gravid, nontender  Extremities: Warm and well perfused.  Non tender.    Pregravid Weight/BMI: 52.6 kg (116 lb) (BMI 19.61)  Current Weight: 55.3 kg (122 lb)   Total Weight Gain: 2.722 kg (6 lb)     Pre- Vitals      Flowsheet Row Most Recent Value   Prenatal Assessment    Fetal Heart Rate 155   Fundal Height (cm) 17 cm   Prenatal Vitals    Blood Pressure 112/70   Weight - Scale 55.3 kg (122 lb)   Urine Albumin/Glucose    Dilation/Effacement/Station    Vaginal Drainage    Draining Fluid No   Edema    LLE Edema None   RLE Edema None   Facial Edema None             Antonio Saldana MD  2024 11:12 AM   "

## 2024-11-25 ENCOUNTER — ROUTINE PRENATAL (OUTPATIENT)
Dept: PERINATAL CARE | Facility: CLINIC | Age: 24
End: 2024-11-25
Payer: COMMERCIAL

## 2024-11-25 VITALS
SYSTOLIC BLOOD PRESSURE: 124 MMHG | WEIGHT: 122.2 LBS | HEIGHT: 65 IN | HEART RATE: 95 BPM | BODY MASS INDEX: 20.36 KG/M2 | DIASTOLIC BLOOD PRESSURE: 70 MMHG

## 2024-11-25 DIAGNOSIS — Z3A.19 19 WEEKS GESTATION OF PREGNANCY: Primary | ICD-10-CM

## 2024-11-25 PROCEDURE — 76817 TRANSVAGINAL US OBSTETRIC: CPT | Performed by: OBSTETRICS & GYNECOLOGY

## 2024-11-25 PROCEDURE — 76805 OB US >/= 14 WKS SNGL FETUS: CPT | Performed by: OBSTETRICS & GYNECOLOGY

## 2024-11-25 PROCEDURE — 99213 OFFICE O/P EST LOW 20 MIN: CPT | Performed by: OBSTETRICS & GYNECOLOGY

## 2024-11-25 NOTE — PROGRESS NOTES
Ultrasound Probe Disinfection    A transvaginal ultrasound was performed.   Prior to use, disinfection was performed with High Level Disinfection Process ([employer]).  Probe serial number [id number] POLINA was used.    Montse Holguin  11/25/24  12:50 PM

## 2024-11-25 NOTE — LETTER
November 25, 2024     Adilson Key V,   [address]  [address]  [facility] [address]    Patient: Bianca Hernández   YOB: 2000   Date of Visit: 11/25/2024       Dear Dr. Key:    Thank you for referring Bianca Hernández to me for evaluation. Below are my notes for this consultation.    If you have questions, please do not hesitate to call me. I look forward to following your patient along with you.         Sincerely,        Darvin Rosario MD        CC: No Recipients    Darvin Rosario MD  11/25/2024  5:39 PM  Sign when Signing Visit  A fetal ultrasound was completed. See Ob procedures in Epic for an interpretation and recommendations. Do not hesitate to contact us in [facility] if you have questions.    Darvin Rosario MD, MSCE  Maternal Fetal Medicine

## 2024-11-25 NOTE — PROGRESS NOTES
A fetal ultrasound was completed. See Ob procedures in Epic for an interpretation and recommendations. Do not hesitate to contact us in [facility] if you have questions.    Darvin Rosario MD, MSCE  Maternal Fetal Medicine

## 2024-11-26 ENCOUNTER — APPOINTMENT (OUTPATIENT)
Dept: LAB | Facility: CLINIC | Age: 24
End: 2024-11-26
Payer: COMMERCIAL

## 2024-11-26 DIAGNOSIS — Z36.1 NEED FOR MATERNAL SERUM ALPHA-PROTEIN (MSAFP) SCREENING: ICD-10-CM

## 2024-11-26 PROCEDURE — 82105 ALPHA-FETOPROTEIN SERUM: CPT

## 2024-11-26 PROCEDURE — 36415 COLL VENOUS BLD VENIPUNCTURE: CPT

## 2024-11-29 LAB
2ND TRIMESTER 4 SCREEN SERPL-IMP: NORMAL
AFP ADJ MOM SERPL: 0.91
AFP INTERP AMN-IMP: NORMAL
AFP INTERP SERPL-IMP: NORMAL
AFP INTERP SERPL-IMP: NORMAL
AFP SERPL-MCNC: 54.9 NG/ML
AGE AT DELIVERY: 25.8 YR
GA METHOD: NORMAL
GA: 19.4 WEEKS
IDDM PATIENT QL: NO
MULTIPLE PREGNANCY: NO
NEURAL TUBE DEFECT RISK FETUS: NORMAL %

## 2024-12-03 ENCOUNTER — RESULTS FOLLOW-UP (OUTPATIENT)
Dept: OBGYN CLINIC | Facility: CLINIC | Age: 24
End: 2024-12-03

## 2024-12-11 ENCOUNTER — ROUTINE PRENATAL (OUTPATIENT)
Dept: OBGYN CLINIC | Facility: CLINIC | Age: 24
End: 2024-12-11
Payer: COMMERCIAL

## 2024-12-11 VITALS
DIASTOLIC BLOOD PRESSURE: 70 MMHG | BODY MASS INDEX: 20.99 KG/M2 | HEIGHT: 65 IN | WEIGHT: 126 LBS | SYSTOLIC BLOOD PRESSURE: 120 MMHG

## 2024-12-11 DIAGNOSIS — Z3A.21 21 WEEKS GESTATION OF PREGNANCY: ICD-10-CM

## 2024-12-11 DIAGNOSIS — Z34.02 ENCOUNTER FOR SUPERVISION OF NORMAL FIRST PREGNANCY IN SECOND TRIMESTER: Primary | ICD-10-CM

## 2024-12-11 PROBLEM — Z3A.24 24 WEEKS GESTATION OF PREGNANCY: Status: ACTIVE | Noted: 2024-10-15

## 2024-12-11 LAB
SL AMB  POCT GLUCOSE, UA: NORMAL
SL AMB POCT URINE PROTEIN: NORMAL

## 2024-12-11 PROCEDURE — 81002 URINALYSIS NONAUTO W/O SCOPE: CPT | Performed by: STUDENT IN AN ORGANIZED HEALTH CARE EDUCATION/TRAINING PROGRAM

## 2024-12-11 PROCEDURE — PNV: Performed by: STUDENT IN AN ORGANIZED HEALTH CARE EDUCATION/TRAINING PROGRAM

## 2024-12-11 NOTE — ASSESSMENT & PLAN NOTE
- Labs up to date   - Low risk genetic testing   - Level II US with incomplete anatomy assessment, plan to return for repeat US on 12/23  - Declines flu vaccination    - RTC for 24 week OB visit     Orders:    POCT urine dip

## 2024-12-11 NOTE — ASSESSMENT & PLAN NOTE
- Labs up to date   - Low risk genetic testing   - Level II US with incomplete anatomy assessment, plan to return for repeat US on 12/23  - Declines flu vaccination    - RTC for 24 week OB visit

## 2024-12-11 NOTE — PROGRESS NOTES
"Routine Prenatal Visit  [facility] - [place]  [address] [place] [address]  Assessment & Plan  Encounter for supervision of normal first pregnancy in second trimester         21 weeks gestation of pregnancy  - Labs up to date   - Low risk genetic testing   - Level II US with incomplete anatomy assessment, plan to return for repeat US on   - Declines flu vaccination    - RTC for 24 week OB visit     Orders:    POCT urine dip    Subjective:   Bianca Hernández is a 24 y.o.  who presents for routine prenatal care at 21w4d.  Complaints today: none   LOF: no; VB: no; Contractions: no; FM: N/A    Objective:  /70 (BP Location: Right arm, Patient Position: Sitting, Cuff Size: Standard)   Ht 5' 4.5\" (1.638 m)   Wt 57.2 kg (126 lb)   LMP 2024 (Exact Date)   BMI 21.29 kg/m²     General: Well appearing, no distress  Respiratory: Unlabored breathing  Cardiovascular: Regular rate.  Abdomen: Soft, gravid, nontender  Extremities: Warm and well perfused.  Non tender.    Pregravid Weight/BMI: 52.6 kg (116 lb) (BMI 19.61)  Current Weight: 57.2 kg (126 lb)   Total Weight Gain: 4.536 kg (10 lb)     Pre-Tsering Vitals      Flowsheet Row Most Recent Value   Prenatal Assessment    Fetal Heart Rate 155   Prenatal Vitals    Blood Pressure 120/70   Weight - Scale 57.2 kg (126 lb)   Urine Albumin/Glucose    Dilation/Effacement/Station    Vaginal Drainage    Edema              Gonzalo Perez MD  2024 10:34 AM    "

## 2024-12-20 ENCOUNTER — TELEPHONE (OUTPATIENT)
Dept: OBGYN CLINIC | Facility: CLINIC | Age: 24
End: 2024-12-20

## 2024-12-20 NOTE — TELEPHONE ENCOUNTER
Overall how are you doing?   Overall reports she is feeling good.    Compliant with routine OB care appointments? yes    Have you completed your 1st trimester labs?   yes  If you had NIPS with MFM, do you have a order for MSAFP? 11/26/24   Can be completed 15w-22w9d, ideally 16w-18w    Have you seen MFM and do you have your detailed US scheduled? 11/25/24    Pregnancy Education-have you had a chance to review the classes offered and registered? Has not had a chance to review courses. Directed to educational sheet to review. Recommended CB education class for first time parents.

## 2024-12-23 ENCOUNTER — ULTRASOUND (OUTPATIENT)
Dept: PERINATAL CARE | Facility: OTHER | Age: 24
End: 2024-12-23
Payer: COMMERCIAL

## 2024-12-23 VITALS
HEART RATE: 81 BPM | SYSTOLIC BLOOD PRESSURE: 122 MMHG | HEIGHT: 65 IN | BODY MASS INDEX: 21.23 KG/M2 | DIASTOLIC BLOOD PRESSURE: 60 MMHG | WEIGHT: 127.4 LBS

## 2024-12-23 DIAGNOSIS — Z3A.23 23 WEEKS GESTATION OF PREGNANCY: Primary | ICD-10-CM

## 2024-12-23 DIAGNOSIS — Z36.89 ENCOUNTER FOR ULTRASOUND TO CHECK FETAL GROWTH: ICD-10-CM

## 2024-12-23 DIAGNOSIS — Z36.2 ENCOUNTER FOR FOLLOW-UP ULTRASOUND OF FETAL ANATOMY: ICD-10-CM

## 2024-12-23 PROCEDURE — 76816 OB US FOLLOW-UP PER FETUS: CPT | Performed by: OBSTETRICS & GYNECOLOGY

## 2024-12-24 NOTE — PROGRESS NOTES
This patient received  care under my supervision at [facility].  The note is contained in the ultrasound report located under OB Procedures tab in Epic.  Please call our office at [phone] with questions.  -Savannah Freed MD

## 2025-01-09 ENCOUNTER — ROUTINE PRENATAL (OUTPATIENT)
Dept: OBGYN CLINIC | Facility: CLINIC | Age: 25
End: 2025-01-09
Payer: COMMERCIAL

## 2025-01-09 VITALS
DIASTOLIC BLOOD PRESSURE: 56 MMHG | HEIGHT: 65 IN | WEIGHT: 130 LBS | BODY MASS INDEX: 21.66 KG/M2 | SYSTOLIC BLOOD PRESSURE: 112 MMHG

## 2025-01-09 DIAGNOSIS — Z34.02 ENCOUNTER FOR SUPERVISION OF NORMAL FIRST PREGNANCY IN SECOND TRIMESTER: ICD-10-CM

## 2025-01-09 DIAGNOSIS — Z3A.25 25 WEEKS GESTATION OF PREGNANCY: Primary | ICD-10-CM

## 2025-01-09 DIAGNOSIS — Z3A.21 21 WEEKS GESTATION OF PREGNANCY: ICD-10-CM

## 2025-01-09 DIAGNOSIS — Z36.89 ENCOUNTER FOR OTHER SPECIFIED ANTENATAL SCREENING: ICD-10-CM

## 2025-01-09 LAB
SL AMB  POCT GLUCOSE, UA: NORMAL
SL AMB POCT URINE PROTEIN: NORMAL

## 2025-01-09 PROCEDURE — 81002 URINALYSIS NONAUTO W/O SCOPE: CPT | Performed by: NURSE PRACTITIONER

## 2025-01-09 PROCEDURE — PNV: Performed by: NURSE PRACTITIONER

## 2025-01-09 NOTE — PATIENT INSTRUCTIONS
Please call if you think you are having contractions, leaking of fluid or any bleeding, or if you have any other concerns.

## 2025-01-09 NOTE — ASSESSMENT & PLAN NOTE
Reviewed S/S PTL vs BH contractions, call with any concerns  Recommend increasing fluid intake  Third trimester labs ordered, discussed timing

## 2025-01-09 NOTE — PROGRESS NOTES
"Routine Prenatal Visit  [facility] - [place]  [address], [place] [address]    Assessment/Plan:  Bianca is a 24 y.o. year old  at 25w5d who presents for routine prenatal visit.     1. 25 weeks gestation of pregnancy  Assessment & Plan:  Reviewed S/S PTL vs BH contractions, call with any concerns  Recommend increasing fluid intake  Third trimester labs ordered, discussed timing  Orders:  -     POCT urine dip  2. Encounter for other specified  screening  -     Glucose, 1H PG; Future  -     CBC; Future  -     RPR-Syphilis Screening (Total Syphilis IGG/IGM); Future  3. 21 weeks gestation of pregnancy  Assessment & Plan:  Reviewed S/S PTL vs BH contractions, call with any concerns  Recommend increasing fluid intake  Third trimester labs ordered, discussed timing  4. Encounter for supervision of normal first pregnancy in second trimester      Next OB Visit 4 weeks.    Subjective:     CC: Prenatal care    Bianca Hernández is a 24 y.o.  female who presents for routine prenatal care at 25w5d. Unsure if having blanco albarado, notices belly getting hard, sometimes a few times an hour.  Lasts under 30 seconds, sometimes will last a little longer. Denies pain, leaking, bleeding. Feels more often when active.   Pregnancy ROS: no leakage of fluid, pelvic pain, or vaginal bleeding.  normal fetal movement.    The following portions of the patient's history were reviewed and updated as appropriate: allergies, current medications, past family history, past medical history, obstetric history, gynecologic history, past social history, past surgical history and problem list.      Objective:  /56 (BP Location: Right arm, Patient Position: Sitting, Cuff Size: Standard)   Ht 5' 4.5\" (1.638 m)   Wt 59 kg (130 lb)   LMP 2024 (Exact Date)   BMI 21.97 kg/m²   Pregravid Weight/BMI: 52.6 kg (116 lb) (BMI 19.61)  Current Weight: 59 kg (130 lb)   Total Weight Gain: 6.35 kg (14 lb)   Pre- " Vitals      Flowsheet Row Most Recent Value   Prenatal Assessment    Fetal Heart Rate 152   Fundal Height (cm) 26 cm   Movement Present   Prenatal Vitals    Blood Pressure 112/56   Weight - Scale 59 kg (130 lb)   Urine Albumin/Glucose    Dilation/Effacement/Station    Vaginal Drainage    Draining Fluid No   Edema    LLE Edema None   RLE Edema None   Facial Edema None             General: Well appearing, no distress  Abdomen: Soft, gravid, nontender  Extremities: Non tender.

## 2025-01-28 ENCOUNTER — ROUTINE PRENATAL (OUTPATIENT)
Dept: OBGYN CLINIC | Facility: CLINIC | Age: 25
End: 2025-01-28
Payer: COMMERCIAL

## 2025-01-28 VITALS
WEIGHT: 132 LBS | HEIGHT: 66 IN | DIASTOLIC BLOOD PRESSURE: 64 MMHG | SYSTOLIC BLOOD PRESSURE: 112 MMHG | BODY MASS INDEX: 21.21 KG/M2

## 2025-01-28 DIAGNOSIS — Z3A.28 28 WEEKS GESTATION OF PREGNANCY: ICD-10-CM

## 2025-01-28 DIAGNOSIS — Z34.02 ENCOUNTER FOR SUPERVISION OF NORMAL FIRST PREGNANCY IN SECOND TRIMESTER: ICD-10-CM

## 2025-01-28 DIAGNOSIS — F90.2 ATTENTION DEFICIT HYPERACTIVITY DISORDER (ADHD), COMBINED TYPE: Primary | ICD-10-CM

## 2025-01-28 LAB
DME PARACHUTE DELIVERY DATE REQUESTED: NORMAL
DME PARACHUTE ITEM DESCRIPTION: NORMAL
DME PARACHUTE ORDER STATUS: NORMAL
DME PARACHUTE SUPPLIER NAME: NORMAL
DME PARACHUTE SUPPLIER PHONE: NORMAL
SL AMB  POCT GLUCOSE, UA: NORMAL
SL AMB POCT URINE PROTEIN: NORMAL

## 2025-01-28 PROCEDURE — PNV: Performed by: STUDENT IN AN ORGANIZED HEALTH CARE EDUCATION/TRAINING PROGRAM

## 2025-01-28 PROCEDURE — 81002 URINALYSIS NONAUTO W/O SCOPE: CPT | Performed by: STUDENT IN AN ORGANIZED HEALTH CARE EDUCATION/TRAINING PROGRAM

## 2025-01-28 NOTE — ASSESSMENT & PLAN NOTE
- 3rd trimester lab work, patient encouraged to complete   - Low risk genetic testing   - Last US on 12/23:  MEASUREMENTS (* Included In Average GA)  AC             20.07 cm        24 weeks 5 days* (83%)  BPD             5.44 cm        22 weeks 4 days* (20%)  HC             21.06 cm        23 weeks 1 day * (28%)  Femur           4.28 cm        24 weeks 0 days* (61%)     EFW Hadlock 4    668 grams - 1 lbs 8 oz  (82%)  - Vaccinations: declines flu and Tdap today   - RTC for 30 week visit     Orders:    POCT urine dip

## 2025-01-28 NOTE — PROGRESS NOTES
"Routine Prenatal Visit  [facility] - [place]  [address], [address], [place] [address]   Assessment & Plan  28 weeks gestation of pregnancy  - 3rd trimester lab work, patient encouraged to complete   - Low risk genetic testing   - Last US on :  MEASUREMENTS (* Included In Average GA)  AC             20.07 cm        24 weeks 5 days* (83%)  BPD             5.44 cm        22 weeks 4 days* (20%)  HC             21.06 cm        23 weeks 1 day * (28%)  Femur           4.28 cm        24 weeks 0 days* (61%)     EFW Hadlock 4    668 grams - 1 lbs 8 oz  (82%)  - Vaccinations: declines flu and Tdap today   - RTC for 30 week visit     Orders:    POCT urine dip    Encounter for supervision of normal first pregnancy in second trimester         Attention deficit hyperactivity disorder (ADHD), combined type  - Not on any medications        Subjective:   Bianca Hernández is a 24 y.o.  who presents for routine prenatal care at 28w3d.  Complaints today: none     LOF: no; VB: no; Contractions: no; FM: yes     Objective:  /64 (BP Location: Right arm, Patient Position: Sitting, Cuff Size: Standard)   Ht 5' 5.5\" (1.664 m)   Wt 59.9 kg (132 lb)   LMP 2024 (Exact Date)   BMI 21.63 kg/m²     General: Well appearing, no distress  Respiratory: Unlabored breathing  Cardiovascular: Regular rate.  Abdomen: Soft, gravid, nontender  Extremities: Warm and well perfused.  Non tender.    Pregravid Weight/BMI: 52.6 kg (116 lb) (BMI 19.00)  Current Weight: 59.9 kg (132 lb)   Total Weight Gain: 7.258 kg (16 lb)     Pre-Tsering Vitals      Flowsheet Row Most Recent Value   Prenatal Assessment    Fetal Heart Rate 152   Fundal Height (cm) 28 cm   Prenatal Vitals    Blood Pressure 112/64   Weight - Scale 59.9 kg (132 lb)   Urine Albumin/Glucose    Dilation/Effacement/Station    Vaginal Drainage    Edema            Gonzalo Perez MD  2025 10:56 AM    "

## 2025-01-30 ENCOUNTER — APPOINTMENT (OUTPATIENT)
Dept: LAB | Facility: CLINIC | Age: 25
End: 2025-01-30
Payer: COMMERCIAL

## 2025-01-30 ENCOUNTER — APPOINTMENT (OUTPATIENT)
Dept: LAB | Facility: HOSPITAL | Age: 25
End: 2025-01-30
Payer: COMMERCIAL

## 2025-01-30 DIAGNOSIS — Z36.89 ENCOUNTER FOR OTHER SPECIFIED ANTENATAL SCREENING: ICD-10-CM

## 2025-01-30 LAB
ERYTHROCYTE [DISTWIDTH] IN BLOOD BY AUTOMATED COUNT: 12.9 % (ref 11.6–15.1)
GLUCOSE 1H P 50 G GLC PO SERPL-MCNC: 123 MG/DL (ref 70–134)
HCT VFR BLD AUTO: 35.7 % (ref 34.8–46.1)
HGB BLD-MCNC: 12.2 G/DL (ref 11.5–15.4)
MCH RBC QN AUTO: 30.7 PG (ref 26.8–34.3)
MCHC RBC AUTO-ENTMCNC: 34.2 G/DL (ref 31.4–37.4)
MCV RBC AUTO: 90 FL (ref 82–98)
PLATELET # BLD AUTO: 274 THOUSANDS/UL (ref 149–390)
PMV BLD AUTO: 10.1 FL (ref 8.9–12.7)
RBC # BLD AUTO: 3.98 MILLION/UL (ref 3.81–5.12)
WBC # BLD AUTO: 10.73 THOUSAND/UL (ref 4.31–10.16)

## 2025-01-30 PROCEDURE — 85027 COMPLETE CBC AUTOMATED: CPT

## 2025-01-30 PROCEDURE — 36415 COLL VENOUS BLD VENIPUNCTURE: CPT

## 2025-01-30 PROCEDURE — 82950 GLUCOSE TEST: CPT

## 2025-01-30 PROCEDURE — 86780 TREPONEMA PALLIDUM: CPT

## 2025-01-31 LAB — TREPONEMA PALLIDUM IGG+IGM AB [PRESENCE] IN SERUM OR PLASMA BY IMMUNOASSAY: NORMAL

## 2025-02-03 ENCOUNTER — RESULTS FOLLOW-UP (OUTPATIENT)
Dept: OBGYN CLINIC | Facility: CLINIC | Age: 25
End: 2025-02-03

## 2025-02-07 LAB
DME PARACHUTE DELIVERY DATE ACTUAL: NORMAL
DME PARACHUTE DELIVERY DATE REQUESTED: NORMAL
DME PARACHUTE ITEM DESCRIPTION: NORMAL
DME PARACHUTE ORDER STATUS: NORMAL
DME PARACHUTE SUPPLIER NAME: NORMAL
DME PARACHUTE SUPPLIER PHONE: NORMAL

## 2025-02-09 PROBLEM — Z3A.30 30 WEEKS GESTATION OF PREGNANCY: Status: ACTIVE | Noted: 2024-10-15

## 2025-02-09 NOTE — ASSESSMENT & PLAN NOTE
- 3rd trimester lab work unremarkable   - Low risk genetic testing   - Last US on 12/23:  MEASUREMENTS (* Included In Average GA)  AC             20.07 cm        24 weeks 5 days* (83%)  BPD             5.44 cm        22 weeks 4 days* (20%)  HC             21.06 cm        23 weeks 1 day * (28%)  Femur           4.28 cm        24 weeks 0 days* (61%)     EFW Hadlock 4    668 grams - 1 lbs 8 oz  (82%)  - Vaccinations: declines today    - RTC for 32 week visit    Orders:    POCT urine dip

## 2025-02-09 NOTE — PROGRESS NOTES
"Routine Prenatal Visit  [facility] - [place]  [address], [address], [place] [address]   Assessment & Plan  Encounter for supervision of normal first pregnancy in second trimester         Attention deficit hyperactivity disorder (ADHD), combined type  - Not on any medications   - Reports symptoms well controlled         30 weeks gestation of pregnancy  - 3rd trimester lab work unremarkable   - Low risk genetic testing   - Last US on :  MEASUREMENTS (* Included In Average GA)  AC             20.07 cm        24 weeks 5 days* (83%)  BPD             5.44 cm        22 weeks 4 days* (20%)  HC             21.06 cm        23 weeks 1 day * (28%)  Femur           4.28 cm        24 weeks 0 days* (61%)     EFW Hadlock 4    668 grams - 1 lbs 8 oz  (82%)  - Vaccinations: declines today    - RTC for 32 week visit    Orders:    POCT urine dip    Subjective:   Bianca Hernández is a 24 y.o.  who presents for routine prenatal care at 30w1d.  Complaints today: none     LOF: no; VB: no; Contractions: no; FM: yes     Objective:  /62 (BP Location: Left arm, Patient Position: Sitting, Cuff Size: Standard)   Ht 5' 5.5\" (1.664 m)   Wt 60.3 kg (133 lb)   LMP 2024 (Exact Date)   BMI 21.80 kg/m²     General: Well appearing, no distress  Respiratory: Unlabored breathing  Cardiovascular: Regular rate.  Abdomen: Soft, gravid, nontender  Extremities: Warm and well perfused.  Non tender.    Pregravid Weight/BMI: 52.6 kg (116 lb) (BMI 19.00)  Current Weight: 60.3 kg (133 lb)   Total Weight Gain: 7.711 kg (17 lb)   Pre-Tsering Vitals      Flowsheet Row Most Recent Value   Prenatal Assessment    Fetal Heart Rate 150   Fundal Height (cm) 29 cm   Prenatal Vitals    Blood Pressure 122/62   Weight - Scale 60.3 kg (133 lb)   Urine Albumin/Glucose    Dilation/Effacement/Station    Vaginal Drainage    Edema           Gonzalo Perez MD  2025 11:21 AM    "

## 2025-02-11 ENCOUNTER — ROUTINE PRENATAL (OUTPATIENT)
Dept: OBGYN CLINIC | Facility: CLINIC | Age: 25
End: 2025-02-11
Payer: COMMERCIAL

## 2025-02-11 VITALS
SYSTOLIC BLOOD PRESSURE: 122 MMHG | BODY MASS INDEX: 21.38 KG/M2 | HEIGHT: 66 IN | WEIGHT: 133 LBS | DIASTOLIC BLOOD PRESSURE: 62 MMHG

## 2025-02-11 DIAGNOSIS — Z3A.30 30 WEEKS GESTATION OF PREGNANCY: ICD-10-CM

## 2025-02-11 DIAGNOSIS — Z34.02 ENCOUNTER FOR SUPERVISION OF NORMAL FIRST PREGNANCY IN SECOND TRIMESTER: ICD-10-CM

## 2025-02-11 DIAGNOSIS — F90.2 ATTENTION DEFICIT HYPERACTIVITY DISORDER (ADHD), COMBINED TYPE: Primary | ICD-10-CM

## 2025-02-11 LAB
SL AMB  POCT GLUCOSE, UA: NORMAL
SL AMB POCT URINE PROTEIN: NORMAL

## 2025-02-11 PROCEDURE — PNV: Performed by: STUDENT IN AN ORGANIZED HEALTH CARE EDUCATION/TRAINING PROGRAM

## 2025-02-11 PROCEDURE — 81002 URINALYSIS NONAUTO W/O SCOPE: CPT | Performed by: STUDENT IN AN ORGANIZED HEALTH CARE EDUCATION/TRAINING PROGRAM

## 2025-02-14 ENCOUNTER — TELEPHONE (OUTPATIENT)
Dept: OBGYN CLINIC | Facility: CLINIC | Age: 25
End: 2025-02-14

## 2025-02-14 NOTE — TELEPHONE ENCOUNTER
Third Trimster call placed-Voicemail received, left a message for patient to call back and sent message through [employer].     Overall how are you feeling?     Compliant with routine OB appointments? Yes, scheduled up to 36 weeks,    Have you completed your 3rd trimester lab work? yes    Have you reviewed the contents of 3rd trimester folder from office?    Have you decided on a pediatrician?     If yes, who     If no, reviewed practices and transferred call to [phone] to set up appointment with pediatric office.   Questions on paperwork to go back to office?    Questions on the baby birth certificate and photography forms?       Sent link for the Hospital Readiness Video via [employer]

## 2025-02-25 ENCOUNTER — ROUTINE PRENATAL (OUTPATIENT)
Dept: OBGYN CLINIC | Facility: CLINIC | Age: 25
End: 2025-02-25
Payer: COMMERCIAL

## 2025-02-25 VITALS
DIASTOLIC BLOOD PRESSURE: 64 MMHG | BODY MASS INDEX: 21.79 KG/M2 | SYSTOLIC BLOOD PRESSURE: 122 MMHG | HEIGHT: 66 IN | WEIGHT: 135.6 LBS

## 2025-02-25 DIAGNOSIS — Z3A.32 32 WEEKS GESTATION OF PREGNANCY: ICD-10-CM

## 2025-02-25 DIAGNOSIS — O26.843 UTERINE SIZE DATE DISCREPANCY PREGNANCY, THIRD TRIMESTER: Primary | ICD-10-CM

## 2025-02-25 LAB
SL AMB  POCT GLUCOSE, UA: NORMAL
SL AMB POCT URINE PROTEIN: NORMAL

## 2025-02-25 PROCEDURE — PNV: Performed by: OBSTETRICS & GYNECOLOGY

## 2025-02-25 PROCEDURE — 81002 URINALYSIS NONAUTO W/O SCOPE: CPT | Performed by: OBSTETRICS & GYNECOLOGY

## 2025-02-25 NOTE — PROGRESS NOTES
"Routine Prenatal Visit  [facility] - [place]  [address] [place] [address]    Assessment/Plan:  Bianca is a 24 y.o. year old  at 32w3d who presents for routine prenatal visit.     1. Uterine size date discrepancy pregnancy, third trimester  Assessment & Plan:  Check growth as fundal height is 29cm    Orders:  -     Ambulatory Referral to Maternal Fetal Medicine; Future; Expected date: 2025  2. 32 weeks gestation of pregnancy  Assessment & Plan:  Declined TDAP today, may consider next visit  Orders:  -     POCT urine dip        Subjective:   Bianca Hernández is a 24 y.o.  who presents for routine prenatal care at 32w3d.  Complaints today: Denies  LOF: -; VB: -; Contractions: -; FM: +    Objective:  /64 (BP Location: Left arm, Patient Position: Sitting, Cuff Size: Standard)   Ht 5' 5.5\" (1.664 m)   Wt 61.5 kg (135 lb 9.6 oz)   LMP 2024 (Exact Date)   BMI 22.22 kg/m²     General: Well appearing, no distress  Respiratory: Unlabored breathing  Abdomen: Soft, gravid, nontender  Extremities: Warm and well perfused.  Non tender.    Pregravid Weight/BMI: 52.6 kg (116 lb) (BMI 19.00)  Current Weight: 61.5 kg (135 lb 9.6 oz)   Total Weight Gain: 8.891 kg (19 lb 9.6 oz)     Pre- Vitals      Flowsheet Row Most Recent Value   Prenatal Assessment    Fetal Heart Rate 150   Fundal Height (cm) 29 cm   Movement Present   Prenatal Vitals    Blood Pressure 122/64   Weight - Scale 61.5 kg (135 lb 9.6 oz)   Urine Albumin/Glucose    Dilation/Effacement/Station    Vaginal Drainage    Edema              Shil SARY Key DO  2025 10:28 AM     "

## 2025-03-07 NOTE — PATIENT INSTRUCTIONS
"Patient Education     Your baby's movement before birth   The Basics   Written by the doctors and editors at [facility]   When should I start feeling my baby move? -- It depends. Most people first feel their baby moving in the uterus between about 16 and 20 weeks of pregnancy. It might take longer to feel movement if this is your first pregnancy or if the placenta is in the front of your uterus.  What kinds of movements should I feel? -- When you first feel your baby move, it might feel like a gentle flutter in your belly. This is sometimes called \"quickening.\" As the baby grows, their movements will get stronger. You will probably feel them kicking, rolling, and stretching. Later in pregnancy, you might be able to see and feel the baby moving from the outside.  You might notice that your baby is more active at certain times of the day or night. Even before birth, babies have periods of being asleep and awake. When your baby is sleeping, you might notice that they do not move as much.  Should I keep track of my baby's movements? -- If your pregnancy is healthy, you probably do not need to count or record your baby's movements. Feeling regular movement is a good sign that the baby is doing well.  In some cases, your doctor or midwife might ask you to keep track of your baby's movements. If so, they will tell you how to do this and when to call them.  A change in your baby's movements does not always mean that there is a problem. But in some cases, it can be a sign that the baby is having trouble. If your doctor or midwife is concerned, they can do tests to check on the baby.  If I am asked to track movement, how should I do it? -- There are different ways of tracking your baby's movement. This is sometimes called \"kick counting.\"  Your doctor or midwife will tell you exactly what to track. For example, they might ask you to write down:   How long it takes to feel 10 kicks or movements   How many times your baby moves " in 1 hour  Many experts consider at least 10 movements in 2 hours to be a sign that the baby is doing well. But there is no specific cutoff for exactly how much movement is healthy or unhealthy. Some babies are more active than others, and some pregnant people feel movement more easily than others. The main goal of kick counting is to get to know your baby's normal patterns so you can tell if anything changes.  If you are doing kick counting:   Choose a time of day when your baby is usually active.   Find a quiet place where you will not be distracted.   Lie down on your side in a comfortable position.   Check the clock, or set a timer.   Each time you feel your baby move or kick, write down the time. Some people use a smartphone azael to keep track.   If your baby seems less active than usual, try moving around, eating a snack, and emptying your bladder. This can help wake the baby up if they are asleep.   Stop counting after you have felt 10 kicks, or after the length of time your doctor or midwife told you.  When should I call the doctor? -- Call your doctor or midwife for advice if:   You have concerns about your baby's movement.   Your baby is moving less than they normally do.   You notice a sudden change in the pattern of your baby's movements.   You have any other symptoms that worry you.  All topics are updated as new evidence becomes available and our peer review process is complete.  This topic retrieved from [employer] on: Feb 26, 2024.  Topic 467804 Version 1.0  Release: 32.2.4 - C32.56  © 2024 UpToDate, Inc. and/or its affiliates. All rights reserved.  Consumer Information Use and Disclaimer   Disclaimer: This generalized information is a limited summary of diagnosis, treatment, and/or medication information. It is not meant to be comprehensive and should be used as a tool to help the user understand and/or assess potential diagnostic and treatment options. It does NOT include all information about  conditions, treatments, medications, side effects, or risks that may apply to a specific patient. It is not intended to be medical advice or a substitute for the medical advice, diagnosis, or treatment of a health care provider based on the health care provider's examination and assessment of a patient's specific and unique circumstances. Patients must speak with a health care provider for complete information about their health, medical questions, and treatment options, including any risks or benefits regarding use of medications. This information does not endorse any treatments or medications as safe, effective, or approved for treating a specific patient. UpToDate, Inc. and its affiliates disclaim any warranty or liability relating to this information or the use thereof.The use of this information is governed by the Terms of Use, available at https://www.[employer].com/en/know/clinical-effectiveness-terms. © UpToDate, Inc. and its affiliates and/or licensors. All rights reserved.  Copyright   ©  UpToDate, Inc. and/or its affiliates. All rights reserved.  Thank you for choosing us for your  care today.  If you have any questions about your ultrasound or care, please do not hesitate to contact us or your primary obstetrician.        Some general instructions for your pregnancy are:    Exercise: Aim for 150 minutes per week of regular exercise.  Walking is great!  Nutrition: Choose healthy sources of calcium, iron, and protein.  Avoid ultraprocessed foods and added sugar.  Learn about Preeclampsia: preeclampsia is a common, potentially serious high blood pressure complication in pregnancy.  A blood pressure of 140mmHg (systolic or top number) or 90mmHg (diastolic or bottom number) should be evaluated by your doctor.  Aspirin is sometimes prescribed in early pregnancy to prevent preeclampsia in women with risk factors - ask your obstetrician if you should be on this medication.  For more  resources, visit:  https://www.highriskpregnancyinfo.org/preeclampsia  If you smoke, please try to quit completely but also try to reduce your smoking by as much as possible (as soon as possible).  Do not vape.  Please also avoid cannabis products.  Other warning signs to watch out for in pregnancy or postpartum: chest pain, obstructed breathing or shortness of breath, seizures, thoughts of hurting yourself or your baby, bleeding, a painful or swollen leg, fever, or headache (see AWNN POST-BIRTH Warning Signs campaign).  If these happen call 911.  Itching is also not normal in pregnancy and if you experience this, especially over your hands and feet, potentially worse at night, notify your doctors.

## 2025-03-11 ENCOUNTER — ULTRASOUND (OUTPATIENT)
Dept: PERINATAL CARE | Facility: OTHER | Age: 25
End: 2025-03-11
Payer: COMMERCIAL

## 2025-03-11 VITALS
DIASTOLIC BLOOD PRESSURE: 62 MMHG | WEIGHT: 137 LBS | SYSTOLIC BLOOD PRESSURE: 118 MMHG | HEIGHT: 66 IN | HEART RATE: 102 BPM | BODY MASS INDEX: 22.02 KG/M2

## 2025-03-11 DIAGNOSIS — O26.843 UTERINE SIZE DATE DISCREPANCY PREGNANCY, THIRD TRIMESTER: Primary | ICD-10-CM

## 2025-03-11 DIAGNOSIS — Z36.4 ULTRASOUND FOR ANTENATAL SCREENING FOR FETAL GROWTH RESTRICTION: ICD-10-CM

## 2025-03-11 DIAGNOSIS — Z3A.34 34 WEEKS GESTATION OF PREGNANCY: ICD-10-CM

## 2025-03-11 PROCEDURE — 99213 OFFICE O/P EST LOW 20 MIN: CPT | Performed by: OBSTETRICS & GYNECOLOGY

## 2025-03-11 PROCEDURE — 76816 OB US FOLLOW-UP PER FETUS: CPT | Performed by: OBSTETRICS & GYNECOLOGY

## 2025-03-11 NOTE — LETTER
March 11, 2025     Adilson Key V, DO  [address]  [address]  [place] [facility] [address]    Patient: Bianca Hernández   YOB: 2000   Date of Visit: 3/11/2025       Dear Dr. Key:    Thank you for referring Bianca Hernández to me for evaluation. Below are my notes for this consultation.    If you have questions, please do not hesitate to call me. I look forward to following your patient along with you.         Sincerely,        Henry Akers MD        CC: No Recipients    Henry Akers MD  3/11/2025  2:05 PM  Sign when Signing Visit  Please refer to the [facility] ultrasound report in Ob Procedures for additional information regarding today's visit

## 2025-03-13 ENCOUNTER — ROUTINE PRENATAL (OUTPATIENT)
Dept: OBGYN CLINIC | Facility: CLINIC | Age: 25
End: 2025-03-13
Payer: COMMERCIAL

## 2025-03-13 VITALS
HEIGHT: 66 IN | BODY MASS INDEX: 22.18 KG/M2 | SYSTOLIC BLOOD PRESSURE: 134 MMHG | WEIGHT: 138 LBS | DIASTOLIC BLOOD PRESSURE: 72 MMHG

## 2025-03-13 DIAGNOSIS — O26.843 UTERINE SIZE DATE DISCREPANCY PREGNANCY, THIRD TRIMESTER: Primary | ICD-10-CM

## 2025-03-13 DIAGNOSIS — Z3A.34 34 WEEKS GESTATION OF PREGNANCY: ICD-10-CM

## 2025-03-13 DIAGNOSIS — Z23 NEED FOR TDAP VACCINATION: ICD-10-CM

## 2025-03-13 LAB
SL AMB  POCT GLUCOSE, UA: NORMAL
SL AMB POCT URINE PROTEIN: NORMAL

## 2025-03-13 PROCEDURE — 90471 IMMUNIZATION ADMIN: CPT | Performed by: OBSTETRICS & GYNECOLOGY

## 2025-03-13 PROCEDURE — PNV: Performed by: OBSTETRICS & GYNECOLOGY

## 2025-03-13 PROCEDURE — 81002 URINALYSIS NONAUTO W/O SCOPE: CPT | Performed by: OBSTETRICS & GYNECOLOGY

## 2025-03-13 PROCEDURE — 90715 TDAP VACCINE 7 YRS/> IM: CPT | Performed by: OBSTETRICS & GYNECOLOGY

## 2025-03-13 NOTE — PROGRESS NOTES
"Routine Prenatal Visit  [facility] [place]  [address] [place] [address]    Assessment/Plan:  Bianca is a 24 y.o. year old  at 34w5d who presents for routine prenatal visit.     1. Uterine size date discrepancy pregnancy, third trimester  Assessment & Plan:  Growth U/S from 2 days ago shows an EFW of 40%. Pt reassured.  2. 34 weeks gestation of pregnancy  -     POCT urine dip  3. Need for Tdap vaccination  -     TDAP VACCINE GREATER THAN OR EQUAL TO 6YO IM        Subjective:     CC: Prenatal care    Bianca Hernández is a 24 y.o.  female who presents for routine prenatal care at 34w5d.  Pregnancy ROS: no leakage of fluid, pelvic pain, or vaginal bleeding.  normal fetal movement.    The following portions of the patient's history were reviewed and updated as appropriate: allergies, current medications, past family history, past medical history, obstetric history, gynecologic history, past social history, past surgical history and problem list.      Objective:  /72 (BP Location: Left arm, Patient Position: Sitting, Cuff Size: Standard)   Ht 5' 5.5\" (1.664 m)   Wt 62.6 kg (138 lb)   LMP 2024 (Exact Date)   BMI 22.62 kg/m²   Pregravid Weight/BMI: 52.6 kg (116 lb) (BMI 19.00)  Current Weight: 62.6 kg (138 lb)   Total Weight Gain: 9.979 kg (22 lb)   Pre-Tsering Vitals      Flowsheet Row Most Recent Value   Prenatal Assessment    Fetal Heart Rate 140   Fundal Height (cm) 33 cm   Movement Present   Presentation Vertex   Prenatal Vitals    Blood Pressure 134/72   Weight - Scale 62.6 kg (138 lb)   Urine Albumin/Glucose    Dilation/Effacement/Station    Vaginal Drainage    Edema              General: Well appearing, no distress  Respiratory: Unlabored breathing  Cardiovascular: Regular rate.  Abdomen: Soft, gravid, nontender  Fundal Height: Appropriate for gestational age.  Extremities: Warm and well perfused.  Non tender.  "

## 2025-03-26 ENCOUNTER — ROUTINE PRENATAL (OUTPATIENT)
Dept: OBGYN CLINIC | Facility: CLINIC | Age: 25
End: 2025-03-26
Payer: COMMERCIAL

## 2025-03-26 VITALS — BODY MASS INDEX: 22.88 KG/M2 | DIASTOLIC BLOOD PRESSURE: 66 MMHG | SYSTOLIC BLOOD PRESSURE: 120 MMHG | WEIGHT: 139.6 LBS

## 2025-03-26 DIAGNOSIS — Z3A.36 36 WEEKS GESTATION OF PREGNANCY: ICD-10-CM

## 2025-03-26 DIAGNOSIS — Z34.02 ENCOUNTER FOR SUPERVISION OF NORMAL FIRST PREGNANCY IN SECOND TRIMESTER: ICD-10-CM

## 2025-03-26 DIAGNOSIS — Z36.85 ANTENATAL SCREENING FOR STREPTOCOCCUS B: Primary | ICD-10-CM

## 2025-03-26 DIAGNOSIS — O26.843 UTERINE SIZE DATE DISCREPANCY PREGNANCY, THIRD TRIMESTER: ICD-10-CM

## 2025-03-26 LAB
SL AMB  POCT GLUCOSE, UA: NEGATIVE
SL AMB POCT URINE PROTEIN: NEGATIVE

## 2025-03-26 PROCEDURE — 81002 URINALYSIS NONAUTO W/O SCOPE: CPT | Performed by: NURSE PRACTITIONER

## 2025-03-26 PROCEDURE — PNV: Performed by: NURSE PRACTITIONER

## 2025-03-26 PROCEDURE — 87150 DNA/RNA AMPLIFIED PROBE: CPT | Performed by: NURSE PRACTITIONER

## 2025-03-26 NOTE — ASSESSMENT & PLAN NOTE
Adam Bright is a 2 Yrs 0 Mos child here for health maintenance exam with dad.    Concerns:  No major issues.  Intermittently seems to have itchy ears occasional earwax buildup.  Diet:  Well-balanced diet  Development:  Normal [place] screen  Potty training:  Well established  Sleep:  No concerns  Lead risk assessment:  Negative      Past Medical History:   Diagnosis Date   • S/P removal of glass fragments from left ring finger - 07/30/2020 7/30/2020   • Single liveborn, born in hospital, delivered by vaginal delivery 2018       History Update    New health problems in the family over the past 12 months:  None    Significant family changes in the past 12 months:  New baby at home    Food insecurity concerns in the past 12 months:  No food insecurity concerns identified    Physical Exam:  Visit Vitals  BP 90/58   Pulse 100   Temp 98.6 °F (37 °C)   Ht 3' 2\" (0.965 m)   Wt 15.1 kg   BMI 16.16 kg/m²     Blood pressure percentiles are 50 % systolic and 88 % diastolic based on the 2017 AAP Clinical Practice Guideline. This reading is in the normal blood pressure range.  General:  Well developed, well nourished.  HEENT:  Head:  Normocephalic.  Eyes:  Conjugate gaze without injection or discharge. Ears:  Ear canals unremarkable.  Tympanic membranes clear and mobile.  Nose:  No rhinorrhea.  Throat:  Oropharynx benign.  Tonsils normal sized without erythema or exudate.  Dentition normal.   Neck:  Supple, no lymphadenopathy or masses.  Chest:  No deformities or lesions.  Lungs are clear to auscultation bilaterally with normal respiratory effort.  Cardiac:  Regular rate and rhythm, without murmurs.  Peripheral circulation normal. Capillary refill is less than 2 seconds.  Abdomen:  Soft, nondistended, nontender.  No masses or hepatosplenomegaly.  Genitalia:  Normal Marquis 1 male normal male phallus testes descended bilaterally no hernia uncircumcised.  Extremities:  No deformities or lesions.  Back normal.  Neurologic:   GBS and consent done today  Reviewed C, labor precautions  Return visit: 1 week   Muscle tone and strength is intact in upper and lower extremities.  Gait is normal.  No obvious focal deficits.  Skin:  No rash or lesions.    Spot vision screen:  Astigmatism both eyes with other parameters normal.    ASSESSMENT:    1. Healthy 2 Yrs 0 Mos child, normal growth and development.    PLAN:    1. Bright Futures handout is given to the family and reviewed today.   2. Next full health maintenance exam will be in one year, sooner if any new concerns.  3. flu shot not needed today as out of season  4. Recommend use of Debrox ear drops once per week as a preventive measure even though there is not evidence of excessive earwax buildup now  5. Nutrition issues discussed at length.  Reasonable to provide Children's chewable vitamin with minerals and iron tablet once daily.

## 2025-03-26 NOTE — PROGRESS NOTES
Routine Prenatal Visit  [facility] - [place]  [address] [place] [address]    Assessment/Plan:  Bianca is a 24 y.o. year old  at 36w4d who presents for routine prenatal visit.     1.  screening for streptococcus B  -     Strep B DNA probe, amplification  2. 36 weeks gestation of pregnancy  Assessment & Plan:  GBS and consent done today  Reviewed [facility], labor precautions  Return visit: 1 week  Orders:  -     POCT urine dip  3. Encounter for supervision of normal first pregnancy in second trimester  4. Uterine size date discrepancy pregnancy, third trimester  Assessment & Plan:  40th percentile      Next OB Visit 1 weeks.    Subjective:     CC: Prenatal care    Bianca Hernández is a 24 y.o.  female who presents for routine prenatal care at 36w4d. Feels well, no complaints.   Pregnancy ROS: no leakage of fluid, pelvic pain, or vaginal bleeding.  normal fetal movement.    The following portions of the patient's history were reviewed and updated as appropriate: allergies, current medications, past family history, past medical history, obstetric history, gynecologic history, past social history, past surgical history and problem list.      Objective:  /66   Wt 63.3 kg (139 lb 9.6 oz)   LMP 2024 (Exact Date)   BMI 22.88 kg/m²   Pregravid Weight/BMI: 52.6 kg (116 lb) (BMI 19.00)  Current Weight: 63.3 kg (139 lb 9.6 oz)   Total Weight Gain: 10.7 kg (23 lb 9.6 oz)   Pre-Tsering Vitals      Flowsheet Row Most Recent Value   Prenatal Assessment    Fetal Heart Rate 150   Movement Present   Presentation Vertex   Prenatal Vitals    Blood Pressure 120/66   Weight - Scale 63.3 kg (139 lb 9.6 oz)   Urine Albumin/Glucose    Dilation/Effacement/Station    Vaginal Drainage    Draining Fluid No   Edema    LLE Edema None   RLE Edema None   Facial Edema None             General: Well appearing, no distress  Abdomen: Soft, gravid, nontender  Extremities: Non tender.

## 2025-03-26 NOTE — PATIENT INSTRUCTIONS
Please call the office if you are having regular, painful contractions every 5 minutes for one hour, or if you are having any leaking, bleeding, or are concerned about baby's movements

## 2025-03-28 LAB — GP B STREP DNA SPEC QL NAA+PROBE: NEGATIVE

## 2025-03-31 ENCOUNTER — RESULTS FOLLOW-UP (OUTPATIENT)
Dept: OBGYN CLINIC | Facility: CLINIC | Age: 25
End: 2025-03-31

## 2025-04-01 ENCOUNTER — ROUTINE PRENATAL (OUTPATIENT)
Dept: OBGYN CLINIC | Facility: CLINIC | Age: 25
End: 2025-04-01
Payer: COMMERCIAL

## 2025-04-01 VITALS
SYSTOLIC BLOOD PRESSURE: 122 MMHG | HEIGHT: 66 IN | BODY MASS INDEX: 22.34 KG/M2 | DIASTOLIC BLOOD PRESSURE: 74 MMHG | WEIGHT: 139 LBS

## 2025-04-01 DIAGNOSIS — O26.843 UTERINE SIZE DATE DISCREPANCY PREGNANCY, THIRD TRIMESTER: ICD-10-CM

## 2025-04-01 DIAGNOSIS — Z3A.37 37 WEEKS GESTATION OF PREGNANCY: Primary | ICD-10-CM

## 2025-04-01 DIAGNOSIS — Z34.02 ENCOUNTER FOR SUPERVISION OF NORMAL FIRST PREGNANCY IN SECOND TRIMESTER: ICD-10-CM

## 2025-04-01 LAB
SL AMB  POCT GLUCOSE, UA: NORMAL
SL AMB POCT URINE PROTEIN: NORMAL

## 2025-04-01 PROCEDURE — PNV: Performed by: NURSE PRACTITIONER

## 2025-04-01 PROCEDURE — 81002 URINALYSIS NONAUTO W/O SCOPE: CPT | Performed by: NURSE PRACTITIONER

## 2025-04-01 NOTE — PROGRESS NOTES
"Routine Prenatal Visit  [facility] - [place]  [address], [place] [address]    Assessment/Plan:  Bianca is a 24 y.o. year old  at 37w3d who presents for routine prenatal visit.     1. 37 weeks gestation of pregnancy  Assessment & Plan:  Reviewed S/S Labor, FMC  GBS negative  Call with any concerns  Return visit in 1 week  Orders:  -     POCT urine dip  2. Encounter for supervision of normal first pregnancy in second trimester  3. Uterine size date discrepancy pregnancy, third trimester  Assessment & Plan:  40th percentile      Next OB Visit 1 weeks.    Subjective:     CC: Prenatal care    Bianca Hernández is a 24 y.o.  female who presents for routine prenatal care at 37w3d. Feels well, no complaints.   Pregnancy ROS: no leakage of fluid, pelvic pain, or vaginal bleeding.  normal fetal movement.    The following portions of the patient's history were reviewed and updated as appropriate: allergies, current medications, past family history, past medical history, obstetric history, gynecologic history, past social history, past surgical history and problem list.      Objective:  /74 (BP Location: Left arm, Patient Position: Sitting, Cuff Size: Standard)   Ht 5' 5.5\" (1.664 m)   Wt 63 kg (139 lb)   LMP 2024 (Exact Date)   BMI 22.78 kg/m²   Pregravid Weight/BMI: 52.6 kg (116 lb) (BMI 19.00)  Current Weight: 63 kg (139 lb)   Total Weight Gain: 10.4 kg (23 lb)   Pre-Tsering Vitals      Flowsheet Row Most Recent Value   Prenatal Assessment    Fetal Heart Rate 141   Movement Present   Presentation Vertex   Prenatal Vitals    Blood Pressure 122/74   Weight - Scale 63 kg (139 lb)   Urine Albumin/Glucose    Dilation/Effacement/Station    Vaginal Drainage    Draining Fluid No   Edema    LLE Edema None   RLE Edema None   Facial Edema None             General: Well appearing, no distress  Abdomen: Soft, gravid, nontender  Extremities: Non tender.  "

## 2025-04-09 ENCOUNTER — ROUTINE PRENATAL (OUTPATIENT)
Dept: OBGYN CLINIC | Facility: CLINIC | Age: 25
End: 2025-04-09
Payer: COMMERCIAL

## 2025-04-09 ENCOUNTER — HOSPITAL ENCOUNTER (OUTPATIENT)
Facility: HOSPITAL | Age: 25
Discharge: HOME | End: 2025-04-09
Attending: STUDENT IN AN ORGANIZED HEALTH CARE EDUCATION/TRAINING PROGRAM | Admitting: OBSTETRICS & GYNECOLOGY
Payer: COMMERCIAL

## 2025-04-09 VITALS
RESPIRATION RATE: 20 BRPM | WEIGHT: 139 LBS | BODY MASS INDEX: 23.16 KG/M2 | DIASTOLIC BLOOD PRESSURE: 80 MMHG | OXYGEN SATURATION: 96 % | HEART RATE: 93 BPM | HEIGHT: 65 IN | SYSTOLIC BLOOD PRESSURE: 114 MMHG

## 2025-04-09 VITALS
WEIGHT: 139 LBS | SYSTOLIC BLOOD PRESSURE: 120 MMHG | HEIGHT: 66 IN | BODY MASS INDEX: 22.34 KG/M2 | DIASTOLIC BLOOD PRESSURE: 74 MMHG

## 2025-04-09 DIAGNOSIS — O36.8130 DECREASED FETAL MOVEMENTS IN THIRD TRIMESTER, SINGLE OR UNSPECIFIED FETUS: ICD-10-CM

## 2025-04-09 DIAGNOSIS — O26.843 UTERINE SIZE DATE DISCREPANCY PREGNANCY, THIRD TRIMESTER: ICD-10-CM

## 2025-04-09 DIAGNOSIS — Z3A.38 38 WEEKS GESTATION OF PREGNANCY: Primary | ICD-10-CM

## 2025-04-09 DIAGNOSIS — Z34.02 ENCOUNTER FOR SUPERVISION OF NORMAL FIRST PREGNANCY IN SECOND TRIMESTER: ICD-10-CM

## 2025-04-09 PROBLEM — O36.8190 DECREASED FETAL MOVEMENT: Status: ACTIVE | Noted: 2025-04-09

## 2025-04-09 LAB
SL AMB  POCT GLUCOSE, UA: NORMAL
SL AMB POCT URINE PROTEIN: NORMAL

## 2025-04-09 PROCEDURE — NC001 PR NO CHARGE: Performed by: OBSTETRICS & GYNECOLOGY

## 2025-04-09 PROCEDURE — 99211 OFF/OP EST MAY X REQ PHY/QHP: CPT

## 2025-04-09 PROCEDURE — 76815 OB US LIMITED FETUS(S): CPT | Performed by: OBSTETRICS & GYNECOLOGY

## 2025-04-09 PROCEDURE — PNV: Performed by: OBSTETRICS & GYNECOLOGY

## 2025-04-09 PROCEDURE — 81002 URINALYSIS NONAUTO W/O SCOPE: CPT | Performed by: OBSTETRICS & GYNECOLOGY

## 2025-04-09 PROCEDURE — 59025 FETAL NON-STRESS TEST: CPT | Performed by: OBSTETRICS & GYNECOLOGY

## 2025-04-09 NOTE — PROGRESS NOTES
"Routine Prenatal Visit  [facility] - [place]  [address], [address], [place] [address]    Assessment/Plan:  Bianca is a 24 y.o. year old  at 38w4d who presents for routine prenatal visit.     Assessment & Plan  38 weeks gestation of pregnancy    Orders:    POCT urine dip    Encounter for supervision of normal first pregnancy in second trimester         Uterine size date discrepancy pregnancy, third trimester    -  EFW = 40% with growth U/S       Decreased fetal movements in third trimester, single or unspecified fetus   -  sent to L&D for evaluation         Next OB Visit 1 weeks.    Subjective:     CC: Prenatal care    Bianca Hernández is a 24 y.o.  female who presents for routine prenatal care at 38w4d.  Pregnancy ROS: no leakage of fluid, pelvic pain, or vaginal bleeding.  normal fetal movement.    The following portions of the patient's history were reviewed and updated as appropriate: allergies, current medications, past family history, past medical history, obstetric history, gynecologic history, past social history, past surgical history and problem list.      Objective:  /74 (BP Location: Left arm, Patient Position: Sitting, Cuff Size: Standard)   Ht 5' 5.5\" (1.664 m)   Wt 63 kg (139 lb)   LMP 2024 (Exact Date)   BMI 22.78 kg/m²   Pregravid Weight/BMI: 52.6 kg (116 lb) (BMI 19.00)  Current Weight: 63 kg (139 lb)   Total Weight Gain: 10.4 kg (23 lb)   Pre-Tsering Vitals      Flowsheet Row Most Recent Value   Prenatal Assessment    Fetal Heart Rate 150   Fundal Height (cm) 39 cm   Movement Decreased   Prenatal Vitals    Blood Pressure 120/74   Weight - Scale 63 kg (139 lb)   Urine Albumin/Glucose    Dilation/Effacement/Station    Vaginal Drainage    Draining Fluid No   Edema              General: Well appearing, no distress  Abdomen: Soft, gravid, nontender  Extremities: Non tender.  "

## 2025-04-09 NOTE — PROCEDURES
Bianca Hernández, a  at 38w4d with an ANTONI of 2025, by Last Menstrual Period, was seen at [facility] for the following procedure(s): $Procedure Type: TYE, NST]    Nonstress Test  Reason for NST: Decreased Fetal Movement  Variability: Moderate  Decelerations: None  Accelerations: Yes  Baseline: 140 BPM  Uterine Irritability: No  Contractions: Not present    4 Quadrant TYE  TYE Q1 (cm): 5.1 cm  TYE Q2 (cm): 6.1 cm  TYE Q3 (cm): 3.5 cm  TYE Q4 (cm): 6 cm  TYE TOTAL (cm): 20.7 cm

## 2025-04-09 NOTE — H&P
OB H&P  Bianca Hernández  2000  [birth year]          Assessment/Plan:   at 38 weeks.   Decreased fetal movement      24 y.o. yo  at 38 weeks by us and lmp    Chief complaint:  Decreased fetal movement- resolved prior to arrival here   Reassuring fetal testing   Stress to pt to return if decrease recurs    HPI:  Pt presents with above  since this morning.        Pregnancy Complications:  Patient Active Problem List   Diagnosis    ADHD    Controlled substance agreement signed    37 weeks gestation of pregnancy    Supervision of normal first pregnancy    Uterine size date discrepancy pregnancy, third trimester         Past Medical History:  Past Medical History:   Diagnosis Date    ADHD        Past Surgical History:  Past Surgical History:   Procedure Laterality Date    TONSILLECTOMY      WISDOM TOOTH EXTRACTION         Social Hx:  Social History     Socioeconomic History    Marital status: /Civil Union     Spouse name: Not on file    Number of children: Not on file    Years of education: Not on file    Highest education level: Not on file   Occupational History    Not on file   Tobacco Use    Smoking status: Never     Passive exposure: Never    Smokeless tobacco: Never   Vaping Use    Vaping status: Never Used   Substance and Sexual Activity    Alcohol use: Yes     Alcohol/week: 1.0 standard drink of alcohol     Types: 1 Standard drinks or equivalent per week     Comment: Occasional drinking in a social setting    Drug use: Never    Sexual activity: Yes     Partners: Male     Birth control/protection: OCP   Other Topics Concern    Not on file   Social History Narrative    Not on file     Social Drivers of Health     Financial Resource Strain: Not on file   Food Insecurity: No Food Insecurity (2024)    Nursing - Inadequate Food Risk Classification     Worried About Running Out of Food in the Last Year: Never true     Ran Out of Food in the Last Year: Never true     Ran Out of Food in  "the Last Year: Not on file   Transportation Needs: No Transportation Needs (9/22/2024)    PRAPARE - Transportation     Lack of Transportation (Medical): No     Lack of Transportation (Non-Medical): No   Physical Activity: Not on file   Stress: Not on file   Social Connections: Not on file   Intimate Partner Violence: Not At Risk (3/24/2023)    Humiliation, Afraid, Rape, and Kick questionnaire     Fear of Current or Ex-Partner: No     Emotionally Abused: No     Physically Abused: No     Sexually Abused: No   Housing Stability: Low Risk  (9/22/2024)    Housing Stability Vital Sign     Unable to Pay for Housing in the Last Year: No     Number of Times Moved in the Last Year: 0     Homeless in the Last Year: No       Meds:  No current facility-administered medications on file prior to encounter.     Current Outpatient Medications on File Prior to Encounter   Medication Sig Dispense Refill    Prenatal MV-Min-Fe Fum-FA-DHA (PRENATAL+DHA PO) Take by mouth         Allergies:  No Known Allergies        RoS/    Constitutional: Negative    CV: Negative    Pulm: Negative    GI: Negative    Urinary: Negative    Neuro: Negative    Musculoskeletal: Negative      Labs:    Recent Results (from the past 24 hours)   POCT urine dip    Collection Time: 04/09/25 10:46 AM   Result Value Ref Range    POCT URINE PROTEIN Neg     GLUCOSE, UA Neg          Obstetric History:    G1    Labs:  Strep Grp B PCR   Date Value Ref Range Status   03/26/2025 Negative Negative Final     Comment:           Type & Screen:  ABO Grouping   Date Value Ref Range Status   09/24/2024 A  Final      Rh Factor   Date Value Ref Range Status   09/24/2024 Positive  Final    No results found for: \"ANTIBODYSCR\"    Specimen Expiration Date   Date Value Ref Range Status   09/24/2024 [id number]  Final     No results found for: \"HIVAGAB\"  Hepatitis B Surface Ag   Date Value Ref Range Status   09/24/2024 Non-reactive Non-Reactive Final     No results found for: \"RPR\"  Rubella " IgG Quant   Date Value Ref Range Status   09/24/2024 55.7 >14.9 IU/mL Final     Glucose   Date Value Ref Range Status   01/30/2025 123 70 - 134 mg/dL Final     Comment:     <=134 Normal   135-179 Impaired glucose fasting. Perform 3 Hour Glucose Tolerance   >=180 Diagnosis Gestational Diabetes Mellitus           Physical Exam:  Vital signs:    Vitals:    04/09/25 1224   BP: 114/80   Pulse:    Resp:    SpO2:           Constitutional:       General: She is not in acute distress.     Appearance: Normal appearance.   HENT:      Head: Normocephalic.      Nose: Nose normal.   Eyes:      General: No scleral icterus.  Cardiovascular:      Pulses: Normal pulses.   Pulmonary:      Effort: Pulmonary effort is normal. No respiratory distress.      Breath sounds: No wheezing.   Abdominal:      General: Bowel sounds are normal. There is no distension.      Palpations: Abdomen is soft.Uterine size appropriate for gestational age     Tenderness: There is no abdominal tenderness. There is no guarding.   Musculoskeletal:      Cervical back: Neck supple.      Right lower leg: No edema.      Left lower leg: No edema.   Skin:     General: Skin is warm.      Capillary Refill: Capillary refill takes less than 2 seconds.   Neurological:      Mental Status: She is alert and oriented to person, place, and time.   Psychiatric:         Mood and Affect: Mood normal.      Cervix: deferred  FHR: cat 1  CTXN: none

## 2025-04-13 PROBLEM — O26.843 UTERINE SIZE DATE DISCREPANCY PREGNANCY, THIRD TRIMESTER: Status: RESOLVED | Noted: 2025-02-25 | Resolved: 2025-04-13

## 2025-04-13 PROBLEM — Z3A.39 39 WEEKS GESTATION OF PREGNANCY: Status: ACTIVE | Noted: 2024-10-15

## 2025-04-13 NOTE — ASSESSMENT & PLAN NOTE
- 3rd trimester lab work unremarkable   - Low risk genetic testing   - Last US on 3/11:   MEASUREMENTS (* Included In Average GA)  AC             30.28 cm        34 weeks 1 day * (50%)  BPD             8.87 cm        35 weeks 6 days* (86%)  HC             32.49 cm        36 weeks 6 days* (74%)  Femur           6.32 cm        32 weeks 5 days* (7%)     HC/AC           1.07 [0.96 - 1.11]                 (71%)  FL/AC             21 [20 - 24]  FL/BPD            71 [71 - 87]  EFW Hadlock 4   2378 grams - 5 lbs 4 oz   (40%)  - Vaccinations: declines   - GBS negative   - SVE 4-5/70/-3   - Membranes inadvertently ruptured during exam, light meconium stained fluid noted   - fetal HR re-assuring throughout visit   - Patient advised to present to L&D immediately and all teams notified     Orders:    POCT urine dip

## 2025-04-13 NOTE — PROGRESS NOTES
"Routine Prenatal Visit  [facility] - [place]  [address], [address], [place] [address]   Assessment & Plan  39 weeks gestation of pregnancy  - 3rd trimester lab work unremarkable   - Low risk genetic testing   - Last US on 3/11:   MEASUREMENTS (* Included In Average GA)  AC             30.28 cm        34 weeks 1 day * (50%)  BPD             8.87 cm        35 weeks 6 days* (86%)  HC             32.49 cm        36 weeks 6 days* (74%)  Femur           6.32 cm        32 weeks 5 days* (7%)     HC/AC           1.07 [0.96 - 1.11]                 (71%)  FL/AC             21 [20 - 24]  FL/BPD            71 [71 - 87]  EFW Hadlock 4   2378 grams - 5 lbs 4 oz   (40%)  - Vaccinations: declines   - GBS negative   - SVE -/-3   - Membranes inadvertently ruptured during exam, light meconium stained fluid noted   - fetal HR re-assuring throughout visit   - Patient advised to present to L&D immediately and all teams notified     Orders:    POCT urine dip    Attention deficit hyperactivity disorder (ADHD), combined type  - Not on any medications   - Reports symptoms well controlled       Encounter for supervision of normal first pregnancy in second trimester         Subjective:   Bianca Hernández is a 24 y.o.  who presents for routine prenatal care at 39w3d    Complaints today: none    LOF: no; VB: no; Contractions: no; FM: yes     Objective:  /68 (BP Location: Left arm, Patient Position: Sitting, Cuff Size: Standard)   Ht 5' 5\" (1.651 m)   Wt 63.5 kg (140 lb)   LMP 2024 (Exact Date)   BMI 23.30 kg/m²     General: Well appearing, no distress  Respiratory: Unlabored breathing  Cardiovascular: Regular rate.  Abdomen: Soft, gravid, nontender  Extremities: Warm and well perfused.  Non tender.    Pregravid Weight/BMI: 52.6 kg (116 lb) (BMI 19.30)  Current Weight: 63.5 kg (140 lb)   Total Weight Gain: 10.9 kg (24 lb)     Pre-Tsering Vitals      Flowsheet Row Most Recent Value   Prenatal " Assessment    Fetal Heart Rate 145   Prenatal Vitals    Blood Pressure 110/68   Weight - Scale 63.5 kg (140 lb)   Urine Albumin/Glucose    Dilation/Effacement/Station    Vaginal Drainage    Edema           Gonzalo Perez MD  4/15/2025 10:53 AM

## 2025-04-15 ENCOUNTER — ANESTHESIA EVENT (INPATIENT)
Dept: ANESTHESIOLOGY | Facility: HOSPITAL | Age: 25
End: 2025-04-15
Payer: COMMERCIAL

## 2025-04-15 ENCOUNTER — ANESTHESIA (INPATIENT)
Dept: ANESTHESIOLOGY | Facility: HOSPITAL | Age: 25
End: 2025-04-15
Payer: COMMERCIAL

## 2025-04-15 ENCOUNTER — HOSPITAL ENCOUNTER (INPATIENT)
Facility: HOSPITAL | Age: 25
LOS: 1 days | Discharge: HOME | End: 2025-04-16
Attending: STUDENT IN AN ORGANIZED HEALTH CARE EDUCATION/TRAINING PROGRAM | Admitting: STUDENT IN AN ORGANIZED HEALTH CARE EDUCATION/TRAINING PROGRAM
Payer: COMMERCIAL

## 2025-04-15 ENCOUNTER — ROUTINE PRENATAL (OUTPATIENT)
Dept: OBGYN CLINIC | Facility: CLINIC | Age: 25
End: 2025-04-15
Payer: COMMERCIAL

## 2025-04-15 VITALS
DIASTOLIC BLOOD PRESSURE: 68 MMHG | HEIGHT: 65 IN | BODY MASS INDEX: 23.32 KG/M2 | WEIGHT: 140 LBS | SYSTOLIC BLOOD PRESSURE: 110 MMHG

## 2025-04-15 DIAGNOSIS — Z87.59 STATUS POST VACUUM-ASSISTED VAGINAL DELIVERY: ICD-10-CM

## 2025-04-15 DIAGNOSIS — F90.2 ATTENTION DEFICIT HYPERACTIVITY DISORDER (ADHD), COMBINED TYPE: Primary | ICD-10-CM

## 2025-04-15 DIAGNOSIS — Z34.02 ENCOUNTER FOR SUPERVISION OF NORMAL FIRST PREGNANCY IN SECOND TRIMESTER: ICD-10-CM

## 2025-04-15 DIAGNOSIS — Z34.03 ENCOUNTER FOR SUPERVISION OF NORMAL FIRST PREGNANCY IN THIRD TRIMESTER: Primary | ICD-10-CM

## 2025-04-15 DIAGNOSIS — Z3A.39 39 WEEKS GESTATION OF PREGNANCY: ICD-10-CM

## 2025-04-15 PROBLEM — O42.90 PREMATURE RUPTURE OF MEMBRANES: Status: ACTIVE | Noted: 2025-04-15

## 2025-04-15 LAB
ABO GROUP BLD: NORMAL
BASE EXCESS BLDCOA CALC-SCNC: -6 MMOL/L (ref 3–11)
BASE EXCESS BLDCOV CALC-SCNC: -7.4 MMOL/L (ref 1–9)
BASOPHILS # BLD AUTO: 0.04 THOUSANDS/ÂΜL (ref 0–0.1)
BASOPHILS NFR BLD AUTO: 0 % (ref 0–1)
BLD GP AB SCN SERPL QL: NEGATIVE
EOSINOPHIL # BLD AUTO: 0.02 THOUSAND/ÂΜL (ref 0–0.61)
EOSINOPHIL NFR BLD AUTO: 0 % (ref 0–6)
ERYTHROCYTE [DISTWIDTH] IN BLOOD BY AUTOMATED COUNT: 13.2 % (ref 11.6–15.1)
HCO3 BLDCOA-SCNC: 22 MMOL/L (ref 17.3–27.3)
HCO3 BLDCOV-SCNC: 18.9 MMOL/L (ref 12.2–28.6)
HCT VFR BLD AUTO: 39.1 % (ref 34.8–46.1)
HGB BLD-MCNC: 13.7 G/DL (ref 11.5–15.4)
HOLD SPECIMEN: NORMAL
IMM GRANULOCYTES # BLD AUTO: 0.06 THOUSAND/UL (ref 0–0.2)
IMM GRANULOCYTES NFR BLD AUTO: 1 % (ref 0–2)
LYMPHOCYTES # BLD AUTO: 1.72 THOUSANDS/ÂΜL (ref 0.6–4.47)
LYMPHOCYTES NFR BLD AUTO: 15 % (ref 14–44)
MCH RBC QN AUTO: 30.9 PG (ref 26.8–34.3)
MCHC RBC AUTO-ENTMCNC: 35 G/DL (ref 31.4–37.4)
MCV RBC AUTO: 88 FL (ref 82–98)
MONOCYTES # BLD AUTO: 0.65 THOUSAND/ÂΜL (ref 0.17–1.22)
MONOCYTES NFR BLD AUTO: 6 % (ref 4–12)
NEUTROPHILS # BLD AUTO: 8.83 THOUSANDS/ÂΜL (ref 1.85–7.62)
NEUTS SEG NFR BLD AUTO: 78 % (ref 43–75)
NRBC BLD AUTO-RTO: 0 /100 WBCS
O2 CT VFR BLDCOA CALC: 7.9 ML/DL
OXYHGB MFR BLDCOA: 35.5 %
OXYHGB MFR BLDCOV: 54.8 %
PCO2 BLDCOA: 52.4 MM[HG] (ref 30–60)
PCO2 BLDCOV: 40.8 MM HG (ref 27–43)
PH BLDCOA: 7.24 [PH] (ref 7.23–7.43)
PH BLDCOV: 7.28 [PH] (ref 7.19–7.49)
PLATELET # BLD AUTO: 289 THOUSANDS/UL (ref 149–390)
PMV BLD AUTO: 10.7 FL (ref 8.9–12.7)
PO2 BLDCOA: 18.5 MM HG (ref 5–25)
PO2 BLDCOV: 24.8 MM HG (ref 15–45)
RBC # BLD AUTO: 4.43 MILLION/UL (ref 3.81–5.12)
RH BLD: POSITIVE
SAO2 % BLDCOV: 12.1 ML/DL
SL AMB  POCT GLUCOSE, UA: NORMAL
SL AMB POCT URINE PROTEIN: NORMAL
SPECIMEN EXPIRATION DATE: NORMAL
WBC # BLD AUTO: 11.32 THOUSAND/UL (ref 4.31–10.16)

## 2025-04-15 PROCEDURE — 82805 BLOOD GASES W/O2 SATURATION: CPT | Performed by: STUDENT IN AN ORGANIZED HEALTH CARE EDUCATION/TRAINING PROGRAM

## 2025-04-15 PROCEDURE — 0KQM0ZZ REPAIR PERINEUM MUSCLE, OPEN APPROACH: ICD-10-PCS | Performed by: STUDENT IN AN ORGANIZED HEALTH CARE EDUCATION/TRAINING PROGRAM

## 2025-04-15 PROCEDURE — 88307 TISSUE EXAM BY PATHOLOGIST: CPT | Performed by: PATHOLOGY

## 2025-04-15 PROCEDURE — 99202 OFFICE O/P NEW SF 15 MIN: CPT

## 2025-04-15 PROCEDURE — 86780 TREPONEMA PALLIDUM: CPT | Performed by: OBSTETRICS & GYNECOLOGY

## 2025-04-15 PROCEDURE — 59400 OBSTETRICAL CARE: CPT | Performed by: STUDENT IN AN ORGANIZED HEALTH CARE EDUCATION/TRAINING PROGRAM

## 2025-04-15 PROCEDURE — 81002 URINALYSIS NONAUTO W/O SCOPE: CPT | Performed by: STUDENT IN AN ORGANIZED HEALTH CARE EDUCATION/TRAINING PROGRAM

## 2025-04-15 PROCEDURE — 86850 RBC ANTIBODY SCREEN: CPT | Performed by: OBSTETRICS & GYNECOLOGY

## 2025-04-15 PROCEDURE — 85025 COMPLETE CBC W/AUTO DIFF WBC: CPT | Performed by: OBSTETRICS & GYNECOLOGY

## 2025-04-15 PROCEDURE — 86900 BLOOD TYPING SEROLOGIC ABO: CPT | Performed by: OBSTETRICS & GYNECOLOGY

## 2025-04-15 PROCEDURE — PNV: Performed by: STUDENT IN AN ORGANIZED HEALTH CARE EDUCATION/TRAINING PROGRAM

## 2025-04-15 PROCEDURE — 86901 BLOOD TYPING SEROLOGIC RH(D): CPT | Performed by: OBSTETRICS & GYNECOLOGY

## 2025-04-15 PROCEDURE — NC001 PR NO CHARGE: Performed by: OBSTETRICS & GYNECOLOGY

## 2025-04-15 RX ORDER — DIPHENHYDRAMINE HCL 25 MG
25 TABLET ORAL EVERY 6 HOURS PRN
Status: DISCONTINUED | OUTPATIENT
Start: 2025-04-15 | End: 2025-04-16 | Stop reason: HOSPADM

## 2025-04-15 RX ORDER — DOCUSATE SODIUM 100 MG/1
100 CAPSULE, LIQUID FILLED ORAL 2 TIMES DAILY
Status: DISCONTINUED | OUTPATIENT
Start: 2025-04-15 | End: 2025-04-16 | Stop reason: HOSPADM

## 2025-04-15 RX ORDER — OXYCODONE HYDROCHLORIDE 5 MG/1
5 TABLET ORAL EVERY 4 HOURS PRN
Refills: 0 | Status: DISCONTINUED | OUTPATIENT
Start: 2025-04-15 | End: 2025-04-16 | Stop reason: HOSPADM

## 2025-04-15 RX ORDER — LIDOCAINE HYDROCHLORIDE AND EPINEPHRINE 15; 5 MG/ML; UG/ML
INJECTION, SOLUTION EPIDURAL AS NEEDED
Status: DISCONTINUED | OUTPATIENT
Start: 2025-04-15 | End: 2025-04-15 | Stop reason: HOSPADM

## 2025-04-15 RX ORDER — IBUPROFEN 600 MG/1
600 TABLET, FILM COATED ORAL EVERY 6 HOURS PRN
Status: DISCONTINUED | OUTPATIENT
Start: 2025-04-15 | End: 2025-04-16 | Stop reason: HOSPADM

## 2025-04-15 RX ORDER — ONDANSETRON 2 MG/ML
4 INJECTION INTRAMUSCULAR; INTRAVENOUS EVERY 6 HOURS PRN
Status: DISCONTINUED | OUTPATIENT
Start: 2025-04-15 | End: 2025-04-15

## 2025-04-15 RX ORDER — ACETAMINOPHEN 325 MG/1
650 TABLET ORAL EVERY 4 HOURS PRN
Status: DISCONTINUED | OUTPATIENT
Start: 2025-04-15 | End: 2025-04-16 | Stop reason: HOSPADM

## 2025-04-15 RX ORDER — ONDANSETRON 2 MG/ML
4 INJECTION INTRAMUSCULAR; INTRAVENOUS EVERY 8 HOURS PRN
Status: DISCONTINUED | OUTPATIENT
Start: 2025-04-15 | End: 2025-04-16 | Stop reason: HOSPADM

## 2025-04-15 RX ORDER — LIDOCAINE HYDROCHLORIDE 10 MG/ML
INJECTION, SOLUTION EPIDURAL; INFILTRATION; INTRACAUDAL; PERINEURAL AS NEEDED
Status: DISCONTINUED | OUTPATIENT
Start: 2025-04-15 | End: 2025-04-15 | Stop reason: HOSPADM

## 2025-04-15 RX ORDER — BUPIVACAINE HYDROCHLORIDE 2.5 MG/ML
30 INJECTION, SOLUTION EPIDURAL; INFILTRATION; INTRACAUDAL; PERINEURAL ONCE AS NEEDED
Status: DISCONTINUED | OUTPATIENT
Start: 2025-04-15 | End: 2025-04-15

## 2025-04-15 RX ORDER — OXYTOCIN/RINGER'S LACTATE 30/500 ML
PLASTIC BAG, INJECTION (ML) INTRAVENOUS
Status: COMPLETED
Start: 2025-04-15 | End: 2025-04-15

## 2025-04-15 RX ORDER — BENZOCAINE/MENTHOL 6 MG-10 MG
1 LOZENGE MUCOUS MEMBRANE DAILY PRN
Status: DISCONTINUED | OUTPATIENT
Start: 2025-04-15 | End: 2025-04-16 | Stop reason: HOSPADM

## 2025-04-15 RX ORDER — BUTORPHANOL TARTRATE 1 MG/ML
1 INJECTION, SOLUTION INTRAMUSCULAR; INTRAVENOUS
Status: DISCONTINUED | OUTPATIENT
Start: 2025-04-15 | End: 2025-04-15

## 2025-04-15 RX ORDER — SODIUM CHLORIDE, SODIUM LACTATE, POTASSIUM CHLORIDE, CALCIUM CHLORIDE 600; 310; 30; 20 MG/100ML; MG/100ML; MG/100ML; MG/100ML
125 INJECTION, SOLUTION INTRAVENOUS CONTINUOUS
Status: DISCONTINUED | OUTPATIENT
Start: 2025-04-15 | End: 2025-04-15

## 2025-04-15 RX ORDER — CALCIUM CARBONATE 500 MG/1
1000 TABLET, CHEWABLE ORAL DAILY PRN
Status: DISCONTINUED | OUTPATIENT
Start: 2025-04-15 | End: 2025-04-16 | Stop reason: HOSPADM

## 2025-04-15 RX ADMIN — BENZOCAINE AND LEVOMENTHOL 1 APPLICATION: 200; 5 SPRAY TOPICAL at 21:18

## 2025-04-15 RX ADMIN — LIDOCAINE HYDROCHLORIDE,EPINEPHRINE BITARTRATE 3 ML: 15; .005 INJECTION, SOLUTION EPIDURAL; INFILTRATION; INTRACAUDAL; PERINEURAL at 15:08

## 2025-04-15 RX ADMIN — ACETAMINOPHEN 650 MG: 325 TABLET, FILM COATED ORAL at 21:18

## 2025-04-15 RX ADMIN — LIDOCAINE HYDROCHLORIDE,EPINEPHRINE BITARTRATE 2 ML: 15; .005 INJECTION, SOLUTION EPIDURAL; INFILTRATION; INTRACAUDAL; PERINEURAL at 14:48

## 2025-04-15 RX ADMIN — SODIUM CHLORIDE, SODIUM LACTATE, POTASSIUM CHLORIDE, AND CALCIUM CHLORIDE 125 ML/HR: .6; .31; .03; .02 INJECTION, SOLUTION INTRAVENOUS at 15:28

## 2025-04-15 RX ADMIN — LIDOCAINE HYDROCHLORIDE,EPINEPHRINE BITARTRATE 3 ML: 15; .005 INJECTION, SOLUTION EPIDURAL; INFILTRATION; INTRACAUDAL; PERINEURAL at 14:44

## 2025-04-15 RX ADMIN — Medication 250 MILLI-UNITS/MIN: at 17:12

## 2025-04-15 RX ADMIN — LIDOCAINE HYDROCHLORIDE 3 ML: 10 INJECTION, SOLUTION EPIDURAL; INFILTRATION; INTRACAUDAL; PERINEURAL at 14:52

## 2025-04-15 RX ADMIN — IBUPROFEN 600 MG: 600 TABLET ORAL at 20:34

## 2025-04-15 RX ADMIN — ROPIVACAINE HYDROCHLORIDE: 2 INJECTION, SOLUTION EPIDURAL; INFILTRATION at 14:49

## 2025-04-15 RX ADMIN — SODIUM CHLORIDE, SODIUM LACTATE, POTASSIUM CHLORIDE, AND CALCIUM CHLORIDE 999 ML/HR: .6; .31; .03; .02 INJECTION, SOLUTION INTRAVENOUS at 12:30

## 2025-04-15 RX ADMIN — OXYCODONE HYDROCHLORIDE 5 MG: 5 TABLET ORAL at 21:17

## 2025-04-15 RX ADMIN — SODIUM CHLORIDE, SODIUM LACTATE, POTASSIUM CHLORIDE, AND CALCIUM CHLORIDE 125 ML/HR: .6; .31; .03; .02 INJECTION, SOLUTION INTRAVENOUS at 13:30

## 2025-04-15 RX ADMIN — HYDROCORTISONE 1 APPLICATION: 1 CREAM TOPICAL at 21:18

## 2025-04-15 NOTE — ANESTHESIA PROCEDURE NOTES
Epidural Block    Patient location during procedure: OB/L&D  Start time: 4/15/2025 2:42 PM  Reason for block: procedure for pain and at surgeon's request  Staffing  Performed by: Jagjit Jackson MD  Authorized by: Jagjit Jackson MD    Preanesthetic Checklist  Completed: patient identified, IV checked, risks and benefits discussed, surgical consent, monitors and equipment checked, pre-op evaluation and timeout performed  Epidural  Patient position: sitting  Prep: Betadine and site prepped and draped  Sedation Level: no sedation  Patient monitoring: heart rate, frequent blood pressure checks and continuous pulse oximetry  Approach: midline  Location: lumbar, L3-4  Injection technique: DIANA air  Needle  Needle type: Tuohy   Needle gauge: 17 G  Needle insertion depth: 5 cm  Catheter type: side hole  Catheter size: 19 G  Catheter at skin depth: 11 cm  Catheter securement method: tape and stabilization device  Test dose: negative  Assessment  Number of attempts: 1negative aspiration for CSF, negative aspiration for heme and no paresthesia on injection  patient tolerated the procedure well with no immediate complications

## 2025-04-15 NOTE — ANESTHESIA PREPROCEDURE EVALUATION
Procedure:  LABOR ANALGESIA    Relevant Problems   GYN   (+) 39 weeks gestation of pregnancy   (+) Supervision of normal first pregnancy        Physical Exam    Airway    Mallampati score: II  TM Distance: >3 FB  Neck ROM: full     Dental   No notable dental hx     Cardiovascular      Pulmonary      Other Findings  post-pubertal.      Anesthesia Plan  ASA Score- 2     Anesthesia Type- epidural with ASA Monitors.         Additional Monitors:     Airway Plan:            Plan Factors-Exercise tolerance (METS): >4 METS.    Chart reviewed.   Existing labs reviewed.     Patient is not a current smoker.              Induction-     Postoperative Plan-     Perioperative Resuscitation Plan - Level 1 - Full Code.       Informed Consent- Anesthetic plan and risks discussed with patient and spouse.  I personally reviewed this patient with the CRNA. Discussed and agreed on the Anesthesia Plan with the CRNA..    Lab Results   Component Value Date    GLUC 92 11/30/2022    GLUF 92 04/17/2024    ALT 10 04/17/2024    AST 17 04/17/2024    BUN 11 04/17/2024    CALCIUM 9.0 04/17/2024     04/17/2024    CO2 25 04/17/2024    CREATININE 0.67 04/17/2024    HDL 57 04/17/2024    HCT 39.1 04/15/2025    HGB 13.7 04/15/2025     04/15/2025    K 3.8 04/17/2024    TRIG 57 04/17/2024    WBC 11.32 (H) 04/15/2025   Epidural discussed with patient. Side effects, including post dural puncture headache discussed. All questions answered. Consent given by patient.      NPO Status:  Vitals Value Taken Time   Date of last liquid 04/15/25 04/15/25 1146   Time of last liquid 1130 04/15/25 1146   Date of last solid 04/15/25 04/15/25 1146   Time of last solid 0900 04/15/25 1146

## 2025-04-15 NOTE — ANESTHESIA POSTPROCEDURE EVALUATION
Post-Op Assessment Note    CV Status:  Stable  Pain Score: 0    Pain management: adequate       Mental Status:  Alert and awake   Hydration Status:  Euvolemic   PONV Controlled:  Controlled   Airway Patency:  Patent     Post Op Vitals Reviewed: Yes    No anethesia notable event occurred.    Staff: CRNA           Last Filed PACU Vitals:  Vitals Value Taken Time   Temp     Pulse     BP     Resp     SpO2       Epidural removed tip intact

## 2025-04-15 NOTE — L&D DELIVERY NOTE
DELIVERY NOTE  Bianca Hernández 24 y.o. female MRN: [id number]  Unit/Bed#: -01 Encounter: [id number]    Obstetrician:  Antonio Saldana MD    Pre-Delivery Diagnosis: Hawk pregnancy in vertex presentation at 39w3d gestation.    Post-Delivery Diagnosis: Same, delivered    Procedure: Vacuum-assisted vaginal delivery    Delivery Date and Time:  4/15/2025 at 17:11    Umbilical Artery  Recent Labs     04/15/25  1712   PHCART 7.240   BECART -6.0*       Umbilical Vein  Recent Labs     04/15/25  1712   PHCVEN 7.283   BECVEN -7.4*       Apgars: 8 at 1 minute, 9 at 5 minutes    Weight: Pending    Indications for instrumental delivery: Suspicion of fetal compromise           Complications: None    Description of Procedure:  Pt progressed to complete and +3 station after 45 minutes of pushing. Due to recurrent variable and late decelerations with intervening fetal tachycardia, the patient was counseled regarding risks and benefits of vacuum assisted vaginal delivery versus  delivery. The patient desired vacuum assisted delivery and verbally consented to proceed. There were no maternal or fetal contraindications to operative vaginal delivery. The patient's bladder had just been drained via straight catheter. Fetal station and position were reassessed, and the patient's epidural was noted to be adequate. A Kiwi vacuum was applied at the flexion point. Gentle traction was applied, and the fetus was then delivered with two pulls and no pop-offs. A viable male infant was delivered atraumatically from right occiput anterior position through meconium-stained fluid with at 17:11. Umbilical cord was noted to be looped over the left fetal shoulder and impinged between fetal shoulder and right compound arm. There was no nuchal cord. Shoulders delivered easily thereafter. Cord was doubly clamped and cut and the baby was handed off to the neonatology team for assessment. Placenta delivered spontaneously and was noted to  have a centrally-inserted 3-vessel cord. The placenta was sent to pathology. A thorough pelvic exam including rectal exam revealed intact anal sphincter complex and rectum with 2nd degree perineal laceration, left vaginal sidewall laceration, and right sulcal laceration which were then repaired in typical fashion using a combination of 2-0 and 3-0 polyglactin suture. Rectal exam was performed again following completion of repair, with rectum intact and free of suture. The fundus was firm at the umbilicus. Total QBL of 611 mL. All needle, sponge, and instrument counts were noted to be correct. The patient tolerated the procedure well.     Following delivery and subsequent to evaluation of the infant by the attending Neonatologist, I examined the infant along with the infant's parents. The events of delivery were reviewed with the patient and her partner and they were given the opportunity to ask questions. All questions were answered to their apparent satisfaction. Mother and baby were allowed to recover in labor and delivery room.    Antonio Saldana MD  4/15/2025 6:30 PM

## 2025-04-15 NOTE — H&P
OB H&P  Bianca Hernández  2000  [birth year]          24 y.o. yo  at 39 weeks by us and lmp      Assessment:/Plan:     IUP at 39 weeks.   GBS negative  SROM   Occurred in office during exam   Ctx q 2-7 on monitor   Meconium stained fluid   4-5 cm in office 70% -3   Admit, IVF, labs   Desires epidural but not yet      Last US  3/11  40%ile 5 lb 4 oz     Chief complaint:  ROM during exam in office.  Pt felt ctx for several days, does not feel any different now.    HPI:  Contractions:  yes  Fetal movement:  yes  Vaginal bleeding:   no  Leaking of fluid:  yes      Pregnancy Complications:  Patient Active Problem List   Diagnosis    ADHD    Controlled substance agreement signed    39 weeks gestation of pregnancy    Supervision of normal first pregnancy    Decreased fetal movement    Premature rupture of membranes         Past Medical History:  Past Medical History:   Diagnosis Date    ADHD        Past Surgical History:  Past Surgical History:   Procedure Laterality Date    TONSILLECTOMY      WISDOM TOOTH EXTRACTION         Social Hx:  Social History     Socioeconomic History    Marital status: /Civil Union     Spouse name: Not on file    Number of children: Not on file    Years of education: Not on file    Highest education level: Not on file   Occupational History    Not on file   Tobacco Use    Smoking status: Never     Passive exposure: Never    Smokeless tobacco: Never   Vaping Use    Vaping status: Never Used   Substance and Sexual Activity    Alcohol use: Yes     Alcohol/week: 1.0 standard drink of alcohol     Types: 1 Standard drinks or equivalent per week     Comment: Occasional drinking in a social setting    Drug use: Never    Sexual activity: Yes     Partners: Male     Birth control/protection: OCP   Other Topics Concern    Not on file   Social History Narrative    Not on file     Social Drivers of Health     Financial Resource Strain: Not on file   Food Insecurity: Patient Unable  To Answer (4/15/2025)    Nursing - Inadequate Food Risk Classification     Worried About Running Out of Food in the Last Year: Never true     Ran Out of Food in the Last Year: Never true     Ran Out of Food in the Last Year: Patient unable to answer   Transportation Needs: Patient Unable To Answer (4/15/2025)    Nursing - Transportation Risk Classification     Lack of Transportation: Not on file     Lack of Transportation: Patient unable to answer   Physical Activity: Not on file   Stress: Not on file   Social Connections: Not on file   Intimate Partner Violence: Patient Unable To Answer (4/15/2025)    Nursing IPS     Feels Physically and Emotionally Safe: Not on file     Physically Hurt by Someone: Not on file     Humiliated or Emotionally Abused by Someone: Not on file     Physically Hurt by Someone: Patient unable to answer     Hurt or Threatened by Someone: Patient unable to answer   Housing Stability: Patient Unable To Answer (4/15/2025)    Nursing: Inadequate Housing Risk Classification     Has Housing: Not on file     Worried About Losing Housing: Not on file     Unable to Get Utilities: Not on file     Unable to Pay for Housing in the Last Year: Patient unable to answer     Has Housin       Meds:  No current facility-administered medications on file prior to encounter.     Current Outpatient Medications on File Prior to Encounter   Medication Sig Dispense Refill    Prenatal MV-Min-Fe Fum-FA-DHA (PRENATAL+DHA PO) Take by mouth         Allergies:  No Known Allergies    RoS/    Constitutional: Negative    CV: Negative    Pulm: Negative    GI: Negative    Urinary: Negative    Neuro: Negative    Musculoskeletal: Negative    Obstetric History:    G 1    Labs:  Strep Grp B PCR   Date Value Ref Range Status   2025 Negative Negative Final     Comment:           Type & Screen:  ABO Grouping   Date Value Ref Range Status   2024 A  Final      Rh Factor   Date Value Ref Range Status   2024 Positive  " Final    No results found for: \"ANTIBODYSCR\"    Specimen Expiration Date   Date Value Ref Range Status   09/24/2024 20240927  Final     No results found for: \"HIVAGAB\"  Hepatitis B Surface Ag   Date Value Ref Range Status   09/24/2024 Non-reactive Non-Reactive Final     No results found for: \"RPR\"  Rubella IgG Quant   Date Value Ref Range Status   09/24/2024 55.7 >14.9 IU/mL Final     Glucose   Date Value Ref Range Status   01/30/2025 123 70 - 134 mg/dL Final     Comment:     <=134 Normal   135-179 Impaired glucose fasting. Perform 3 Hour Glucose Tolerance   >=180 Diagnosis Gestational Diabetes Mellitus             Physical Exam:      Vital signs: 130/82         HR  99      16    afebrile     Alert, comfortable, no acute distress      Neuro:        Alert, appropriate affect, no gross deficits        Normal speech        CN2-12 intact and symmetric        DTR 3+ and symmetric        Muscle strength 5/5 and symmetric    Regular rate and rhythm    Clear to auscultation bilaterally    Abdomen soft, nontender, nondistended.    Fundus nontender, size consistent with dates      Cephalic  Cervix: 5 cm 70% -3  FHR: cat 1  CTXN: 2-7  Membranes: srom meconium  Pelvis adequate  EFW 7 lb      "

## 2025-04-15 NOTE — OB LABOR/OXYTOCIN SAFETY PROGRESS
Oxytocin Safety Progress Check Note - Bianca Hernández 24 y.o. female MRN: [id number]    Unit/Bed#: -01 Encounter: [id number]       Contraction Frequency (minutes): 2-4  Contraction Intensity: Strong  Uterine Activity Characteristics: Regular  Cervical Dilation: 10  Dilation Complete Date: 04/15/25  Dilation Complete Time: 1612  Cervical Effacement: 100  Fetal Station: 1  Baseline Rate (FHR): 145 bpm  Fetal Heart Rate (FHT): 140 BPM  FHR Category: II           Vital Signs:   Vitals:    04/15/25 1543   BP: 119/62   Pulse: (!) 120   Resp:    Temp:    SpO2:        Notes/comments:   - Fully dilated, +1 station. FHR category II due to occasional late decelerations. Overall reassuring based on moderate variability and present accelerations. Will begin pushing now.       Antonio Saldana MD 4/15/2025 4:14 PM

## 2025-04-16 VITALS
OXYGEN SATURATION: 98 % | RESPIRATION RATE: 17 BRPM | HEIGHT: 65 IN | WEIGHT: 140 LBS | HEART RATE: 81 BPM | TEMPERATURE: 97.9 F | BODY MASS INDEX: 23.32 KG/M2 | DIASTOLIC BLOOD PRESSURE: 56 MMHG | SYSTOLIC BLOOD PRESSURE: 97 MMHG

## 2025-04-16 LAB
ERYTHROCYTE [DISTWIDTH] IN BLOOD BY AUTOMATED COUNT: 13.2 % (ref 11.6–15.1)
HCT VFR BLD AUTO: 27.2 % (ref 34.8–46.1)
HGB BLD-MCNC: 9.5 G/DL (ref 11.5–15.4)
MCH RBC QN AUTO: 31.4 PG (ref 26.8–34.3)
MCHC RBC AUTO-ENTMCNC: 34.9 G/DL (ref 31.4–37.4)
MCV RBC AUTO: 90 FL (ref 82–98)
PLATELET # BLD AUTO: 196 THOUSANDS/UL (ref 149–390)
PMV BLD AUTO: 10.7 FL (ref 8.9–12.7)
RBC # BLD AUTO: 3.03 MILLION/UL (ref 3.81–5.12)
TREPONEMA PALLIDUM IGG+IGM AB [PRESENCE] IN SERUM OR PLASMA BY IMMUNOASSAY: NORMAL
WBC # BLD AUTO: 11.78 THOUSAND/UL (ref 4.31–10.16)

## 2025-04-16 PROCEDURE — NC001 PR NO CHARGE: Performed by: OBSTETRICS & GYNECOLOGY

## 2025-04-16 PROCEDURE — 85027 COMPLETE CBC AUTOMATED: CPT | Performed by: STUDENT IN AN ORGANIZED HEALTH CARE EDUCATION/TRAINING PROGRAM

## 2025-04-16 PROCEDURE — 99024 POSTOP FOLLOW-UP VISIT: CPT | Performed by: OBSTETRICS & GYNECOLOGY

## 2025-04-16 RX ORDER — IBUPROFEN 600 MG/1
600 TABLET, FILM COATED ORAL EVERY 6 HOURS PRN
Qty: 90 TABLET | Refills: 0 | Status: SHIPPED | OUTPATIENT
Start: 2025-04-16

## 2025-04-16 RX ORDER — BENZOCAINE/MENTHOL 6 MG-10 MG
1 LOZENGE MUCOUS MEMBRANE 4 TIMES DAILY PRN
Qty: 14 G | Refills: 0 | Status: SHIPPED | OUTPATIENT
Start: 2025-04-16

## 2025-04-16 RX ORDER — KETOROLAC TROMETHAMINE 30 MG/ML
30 INJECTION, SOLUTION INTRAMUSCULAR; INTRAVENOUS ONCE
Status: COMPLETED | OUTPATIENT
Start: 2025-04-16 | End: 2025-04-16

## 2025-04-16 RX ADMIN — ACETAMINOPHEN 650 MG: 325 TABLET, FILM COATED ORAL at 17:48

## 2025-04-16 RX ADMIN — IBUPROFEN 600 MG: 600 TABLET ORAL at 15:58

## 2025-04-16 RX ADMIN — DOCUSATE SODIUM 100 MG: 100 CAPSULE, LIQUID FILLED ORAL at 08:30

## 2025-04-16 RX ADMIN — DOCUSATE SODIUM 100 MG: 100 CAPSULE, LIQUID FILLED ORAL at 17:48

## 2025-04-16 RX ADMIN — IBUPROFEN 600 MG: 600 TABLET ORAL at 10:13

## 2025-04-16 RX ADMIN — KETOROLAC TROMETHAMINE 30 MG: 30 INJECTION, SOLUTION INTRAMUSCULAR; INTRAVENOUS at 04:00

## 2025-04-16 RX ADMIN — ACETAMINOPHEN 650 MG: 325 TABLET, FILM COATED ORAL at 08:30

## 2025-04-16 RX ADMIN — ACETAMINOPHEN 650 MG: 325 TABLET, FILM COATED ORAL at 12:34

## 2025-04-16 RX ADMIN — ACETAMINOPHEN 650 MG: 325 TABLET, FILM COATED ORAL at 01:26

## 2025-04-16 RX ADMIN — OXYCODONE HYDROCHLORIDE 5 MG: 5 TABLET ORAL at 01:26

## 2025-04-16 NOTE — ASSESSMENT & PLAN NOTE
- doing well postpartum, routine postpartum care  - Dermoplast, tucks and hydrocortisone PRN for perineal pain  - baby boy Apgars 8, 9 weighing 3140 g

## 2025-04-16 NOTE — DISCHARGE SUMMARY
ADMISSION  Patient of: [facility]  Admission Date: 4/15/2025   Admitting Attending: Dr. Antonio Saldana MD   Admitting Diagnoses:   Patient Active Problem List   Diagnosis    ADHD    Controlled substance agreement signed    39 weeks gestation of pregnancy    Status post vacuum-assisted vaginal delivery       DELIVERY  Delivery Method: Vaginal, Vacuum (Extractor)   Delivery Date and Time: 4/15/2025 5:11 PM  Delivery Attending: Antonio Saldana     DISCHARGE  Discharge Date:  25  Discharge Attending: Dr. Antonio Saldana MD   Discharge Diagnosis:   Same, Delivered     Clinical course: Admission to Delivery  Bianca Hernández is a 24 y.o.  who was admitted at 39w3d for SROM.       For pain control in labor, pt received epidural.       Delivery  Route of Delivery: Vaginal, Vacuum (Extractor)       Anesthesia: Epidural,   QBL: Non-Surgical QBL (mL): 611      QBL:        Delivery: Vaginal, Vacuum (Extractor) at 4/15/2025 5:11 PM   Laceration: Perineal: 2° Repaired? Yes    Baby's Weight: 3140 g (6 lb 14.8 oz); 110.76    Apgar scores: 8  and 9  at 1 and 5 minutes, respectively    Clinical Course: Post-Delivery:  The post delivery course was unremarkable.    On the day of discharge, the patient was ambulating, voiding spontaneously, tolerating oral intake, and hemodynamically stable. She was able to reasonably perform all ADLs. She had appropriate bowel function. Pain was well-controlled. She was discharged home on postpartum/postop day #1 without complications . Patient was instructed to follow up with her OB as an outpatient and was given appropriate warnings to call her provider with problems or concerns.    Pertinent lab findings included:   Blood type A+    Last three Hgb values:  Lab Results   Component Value Date    HGB 9.5 (L) 2025    HGB 13.7 04/15/2025    HGB 12.2 2025        Problem-specific follow-up plans included the following:       Discharge med list:  Contraception:        Medication List      START taking these medications     benzocaine-menthol-lanolin-aloe 20-0.5 % topical spray; Commonly known   as: DERMOPLAST; Apply 1 Application topically 3 (three) times a day as   needed for irritation or mild pain   hydrocortisone 1 % cream; Apply 1 Application topically 4 (four) times a   day as needed for rash or irritation   ibuprofen 600 mg tablet; Commonly known as: MOTRIN; Take 1 tablet (600   mg total) by mouth every 6 (six) hours as needed for mild pain (First-line   for mild pain.)   witch hazel-glycerin topical pad; Commonly known as: TUCKS; Apply 1 Pad   topically every 4 (four) hours as needed for irritation     CONTINUE taking these medications     PRENATAL+DHA PO       Condition at discharge:   good     Disposition:   Home    Planned Readmission:   No    Discharge Statement:  I have spent a total time of 30 minutes in caring for this patient on the day of the visit/encounter. >30 minutes of time was spent on: Impressions, Counseling / Coordination of care, Documenting in the medical record, Reviewing / ordering tests, medicine, procedures  , and Communicating with other healthcare professionals .

## 2025-04-16 NOTE — PLAN OF CARE
Problem: POSTPARTUM  Goal: Experiences normal postpartum course  Description: INTERVENTIONS:- Monitor maternal vital signs- Assess uterine involution and lochia  Outcome: Progressing  Goal: Appropriate maternal -  bonding  Description: INTERVENTIONS:- Identify family support- Assess for appropriate maternal/infant bonding -Encourage maternal/infant bonding opportunities- Referral to  or  as needed  Outcome: Progressing  Goal: Establishment of infant feeding pattern  Description: INTERVENTIONS:- Assess breast/bottle feeding- Refer to lactation as needed  Outcome: Progressing  Goal: Incision(s), wounds(s) or drain site(s) healing without S/S of infection  Description: INTERVENTIONS- Assess and document dressing, incision, wound bed, drain sites and surrounding tissue-  Outcome: Progressing     Problem: PAIN - ADULT  Goal: Verbalizes/displays adequate comfort level or baseline comfort level  Description: Interventions:- Encourage patient to monitor pain and request assistance- Assess pain using appropriate pain scale- Administer analgesics based on type and severity of pain and evaluate response- Implement non-pharmacological measures as appropriate and evaluate response- Consider cultural and social influences on pain and pain management- Notify physician/advanced practitioner if interventions unsuccessful or patient reports new pain  Outcome: Progressing     Problem: INFECTION - ADULT  Goal: Absence or prevention of progression during hospitalization  Description: INTERVENTIONS:- Assess and monitor for signs and symptoms of infection- Monitor lab/diagnostic results- Monitor all insertion sites, i.e. indwelling lines, tubes, and drains- Monitor endotracheal if appropriate and nasal secretions for changes in amount and color- [place] appropriate cooling/warming therapies per order- Administer medications as ordered- Instruct and encourage patient and family to use good hand hygiene  technique- Identify and instruct in appropriate isolation precautions for identified infection/condition  Outcome: Progressing  Goal: Absence of fever/infection during neutropenic period  Description: INTERVENTIONS:- Monitor WBC  Outcome: Progressing     Problem: SAFETY ADULT  Goal: Patient will remain free of falls  Description: INTERVENTIONS:- Educate patient/family on patient safety including physical limitations- Instruct patient to call for assistance with activity - Consult OT/PT to assist with strengthening/mobility - Keep Call bell within reach- Keep bed low and locked with side rails adjusted as appropriate- Keep care items and personal belongings within reach- Initiate and maintain comfort rounds- Make Fall Risk Sign visible to staff-  Outcome: Progressing  Goal: Maintain or return to baseline ADL function  Description: INTERVENTIONS:-  Assess patient's ability to carry out ADLs; assess patient's baseline for ADL function and identify physical deficits which impact ability to perform ADLs (bathing, care of mouth/teeth, toileting, grooming, dressing, etc.)- Assess/evaluate cause of self-care deficits - Assess range of motion- Assess patient's mobility; develop plan if impaired- Assess patient's need for assistive devices and provide as appropriate- Encourage maximum independence but intervene and supervise when necessary- Involve family in performance of ADLs- Assess for home care needs following discharge - Consider OT consult to assist with ADL evaluation and planning for discharge- Provide patient education as appropriate  Outcome: Progressing  Goal: Maintains/Returns to pre admission functional level  Description: INTERVENTIONS:- Perform AM-PAC 6 Click Basic Mobility/ Daily Activity assessment daily.- Set and communicate daily mobility goal to care team and patient/family/caregiver. - Collaborate with rehabilitation services on mobility goals if consulted- Perform Range of Motion  Outcome: Progressing      Problem: Knowledge Deficit  Goal: Patient/family/caregiver demonstrates understanding of disease process, treatment plan, medications, and discharge instructions  Description: Complete learning assessment and assess knowledge base.Interventions:- Provide teaching at level of understanding- Provide teaching via preferred learning methods  Outcome: Progressing     Problem: DISCHARGE PLANNING  Goal: Discharge to home or other facility with appropriate resources  Description: INTERVENTIONS:- Identify barriers to discharge w/patient and caregiver- Arrange for needed discharge resources and transportation as appropriate- Identify discharge learning needs (meds, wound care, etc.)- Arrange for interpretive services to assist at discharge as needed- Refer to Case Management Department for coordinating discharge planning if the patient needs post-hospital services based on physician/advanced practitioner order or complex needs related to functional status, cognitive ability, or social support system  Outcome: Progressing

## 2025-04-16 NOTE — PROGRESS NOTES
"Progress Note - OB/GYN  Post-Partum Physician Note   Bianca Hernández 24 y.o. female MRN: [id number]  Unit/Bed#:  207-01 Encounter: [id number]    Assessment:  24 y.o. year-old , postpartum day #1 s/p VAVD.    Assessment & Plan  Status post vacuum-assisted vaginal delivery  - doing well postpartum, routine postpartum care  - Dermoplast, tucks and hydrocortisone PRN for perineal pain  - baby boy Apgars 8, 9 weighing 3140 g      _________________________________    Subjective:   No acute events overnight. Reports perineal tenderness but not severe pain. Denies HA, visual changes, epigastric pain. Denies shortness of breath or chest pain. Ambulating and voiding without difficulty.  Good urine output. Moderate lochia.      Objective:   Vitals:    04/15/25 1729 04/15/25 1745 04/15/25 2300 25 0740   BP: 101/60 105/53 105/65 97/61   BP Location:   Right arm Right arm   Pulse: 89 81 93 74   Resp:   18 17   Temp:   98.3 °F (36.8 °C) 97.9 °F (36.6 °C)   TempSrc:   Oral Temporal   SpO2:   100% 98%   Weight:       Height:           Intake/Output Summary (Last 24 hours) at 2025 0820  Last data filed at 2025 0400  Gross per 24 hour   Intake --   Output 1711 ml   Net -1711 ml       Physical Exam:  General: AOx3, NAD  Lungs: CTAB  CV: RRR  Abdomen: soft, fundus firm below umbilicus, nontender  Extremities: nontender without edema bilaterally  Perineum: patient declined at this time      Labs/Tests:   Lab Results   Component Value Date/Time    HGB 9.5 (L) 2025 06:23 AM    HGB 13.7 04/15/2025 11:27 AM     2025 06:23 AM     04/15/2025 11:27 AM    WBC 11.78 (H) 2025 06:23 AM    WBC 11.32 (H) 04/15/2025 11:27 AM        Brief OB Lab review:  ABO Grouping   Date Value Ref Range Status   04/15/2025 A  Final      Rh Factor   Date Value Ref Range Status   04/15/2025 Positive  Final    No results found for: \"ANTIBODYSCR\"  No results found for: \"RUBM\"    MEDS:     Current " Facility-Administered Medications:     acetaminophen (TYLENOL) tablet 650 mg, Q4H PRN    benzocaine-menthol-lanolin-aloe (DERMOPLAST) 20-0.5 % topical spray 1 Application, Q6H PRN    calcium carbonate (TUMS) chewable tablet 1,000 mg, Daily PRN    diphenhydrAMINE (BENADRYL) tablet 25 mg, Q6H PRN    docusate sodium (COLACE) capsule 100 mg, BID    hydrocortisone 1 % cream 1 Application, Daily PRN    ibuprofen (MOTRIN) tablet 600 mg, Q6H PRN    ondansetron (ZOFRAN) injection 4 mg, Q8H PRN    oxyCODONE (ROXICODONE) IR tablet 5 mg, Q4H PRN    witch hazel-glycerin (TUCKS) topical pad 1 Pad, Q4H PRN  Invasive Devices       Peripheral Intravenous Line  Duration             Peripheral IV 04/15/25 Dorsal (posterior);Left Forearm <1 day                      Monse Kumar MD  4/16/2025 8:20 AM

## 2025-04-16 NOTE — LACTATION NOTE
This note was copied from a baby's chart.  CONSULT - LACTATION  Baby Boy (Johnnie Hernández 1 days male MRN: [id number]    [facility] NURSERY Room / Bed: (N)/(N) Encounter: [id number]    Maternal Information     MOTHER:  Leslie Hernández  Maternal Age: 24 y.o.  OB History: # 1 - Date: 04/15/25, Sex: Male, Weight: 3140 g (6 lb 14.8 oz), GA: 39w3d, Type: Vaginal, Vacuum (Extractor), Apgar1: 8, Apgar5: 9, Living: Living, Birth Comments: None   Previous breast reduction surgery? No    Lactation history:   Has patient previously breast fed: No   How long had patient previously breast fed:     Previous breast feeding complications:       Past Surgical History:   Procedure Laterality Date    TONSILLECTOMY      WISDOM TOOTH EXTRACTION         Birth information:  YOB: 2026   Time of birth: 5:11 PM   Sex: male   Delivery type: Vaginal, Vacuum (Extractor)   Birth Weight: 3140 g (6 lb 14.8 oz)   Percent of Weight Change: -3%     Gestational Age: 39w3d   [unfilled]    Reason for Consult:    Reason for Consult: Initial assessment (ext) - 20 min, Latch Assess (ext) - 20 min, Initial assessment (routine) - 10 min    Risk Factors:         Breast and nipple assessment:   Breasts/Nipples  Left Breast: Filling  Right Breast: Filling  Left Nipple: Tender  Right Nipple: Tender  Intervention: Hand expression, Hydrogel pads  Breastfeeding Progress: Not yet established (latching to the right breast. Difficulty on the left)    OB Lactation Tools:    Other OB Lactation Tools  Feeding Devices: Comfort Gels, Feeding Cup    Breast Pump:    Breast Pump  Pump: Manual       Assessment: normal assessment    Feeding assessment: latch difficulty (on the left breast baby would suck a little and then pull off.  Stayed latched well to the right breast)  LATCH:  Latch: Grasps breast, tongue down, lips flanged, rhythmic sucking (would not stay latched to the left breast)   Audible  Swallowing: A few with stimulation   Type of Nipple: Everted (After stimulation)   Comfort (Breast/Nipple): Soft/non-tender (Mom has been leaking since week 15)   Hold (Positioning): Partial assist, teach one side, mother does other, staff holds   LATCH Score: 7       [place]el[place]:  Unable to assess                 Feeding recommendations:    Demonstrated how to gently compress breast tissue to align the nipple with the nose and to bring chin on to the underside of breast to encourage the widest, deepest latch.             Feeding Plan:  1. Meet early feeding cues  2. Bring baby to breast skin to skin. Front of baby should be tucked into mom's body completely.  3. Tuck chin deeply into the breast to assist with deeper latch  4. Align nipple to nose, chin deep into underside of  breast, (move baby not breast) and press baby to breast when mouth is wide and deep latch is achieved.  5. Use breast compressions to stimulate suck  6. Introduce breast first for feeding  7. Follow up with outpatient lactation as soon as possible   Anticipatory guidance given on  behavior, skin to skin, how to know when baby is satiated.    Ludmila Jacobsen MA 2025 10:04 AM

## 2025-04-16 NOTE — ADDENDUM NOTE
Addendum  created 04/15/25 2005 by Jagjit Jackson MD    Clinical Note Signed, Review and Sign - Ready for Procedure

## 2025-04-16 NOTE — QUICK NOTE
"Called to assess patient in setting of pelvic/rectal pain. Bleeding has been stable. Patient is otherwise without complaint. Epidural catheter is out, but patient has not yet been up out of bed.     /53   Pulse 81   Temp 98.8 °F (37.1 °C)   Resp 18   Ht 5' 5\" (1.651 m)   Wt 63.5 kg (140 lb)   LMP 07/13/2024 (Exact Date)   SpO2 96%   Breastfeeding Unknown   BMI 23.30 kg/m²      Gen: Well-appearing, NAD  Abd: fundus firm, no fundal tenderness  Perineum: Mild ecchymosis of left labium majus. Moderate vulvar edema noted. Gentle internal vaginal exam performed with sterile glove. Rectal exam subsequently performed with sterile glove. No evidence of hematoma.     Suspect pain secondary to laceration repair and edema. Will apply ice pack. Patient will get up to bathroom with RN assist and attempt to void. Continue acetaminophen and ibuprofen PRN. Will add oxycodone 5 mg PO q4h PRN severe pain.     Antonio Saldana MD  4/15/2025 9:03 PM    "

## 2025-04-17 ENCOUNTER — TELEPHONE (OUTPATIENT)
Dept: DERMATOLOGY | Facility: CLINIC | Age: 25
End: 2025-04-17

## 2025-04-17 NOTE — PLAN OF CARE
Problem: POSTPARTUM  Goal: Experiences normal postpartum course  Description: INTERVENTIONS:- Monitor maternal vital signs- Assess uterine involution and lochia  Outcome: Completed  Goal: Appropriate maternal -  bonding  Description: INTERVENTIONS:- Identify family support- Assess for appropriate maternal/infant bonding -Encourage maternal/infant bonding opportunities- Referral to  or  as needed  Outcome: Completed  Goal: Establishment of infant feeding pattern  Description: INTERVENTIONS:- Assess breast/bottle feeding- Refer to lactation as needed  Outcome: Completed  Goal: Incision(s), wounds(s) or drain site(s) healing without S/S of infection  Description: INTERVENTIONS- Assess and document dressing, incision, wound bed, drain sites and surrounding tissue-  Outcome: Completed     Problem: PAIN - ADULT  Goal: Verbalizes/displays adequate comfort level or baseline comfort level  Description: Interventions:- Encourage patient to monitor pain and request assistance- Assess pain using appropriate pain scale- Administer analgesics based on type and severity of pain and evaluate response- Implement non-pharmacological measures as appropriate and evaluate response- Consider cultural and social influences on pain and pain management- Notify physician/advanced practitioner if interventions unsuccessful or patient reports new pain  Outcome: Completed     Problem: INFECTION - ADULT  Goal: Absence or prevention of progression during hospitalization  Description: INTERVENTIONS:- Assess and monitor for signs and symptoms of infection- Monitor lab/diagnostic results- Monitor all insertion sites, i.e. indwelling lines, tubes, and drains- Monitor endotracheal if appropriate and nasal secretions for changes in amount and color- Prince appropriate cooling/warming therapies per order- Administer medications as ordered- Instruct and encourage patient and family to use good hand hygiene technique-  Identify and instruct in appropriate isolation precautions for identified infection/condition  Outcome: Completed  Goal: Absence of fever/infection during neutropenic period  Description: INTERVENTIONS:- Monitor WBC  Outcome: Completed     Problem: Knowledge Deficit  Goal: Patient/family/caregiver demonstrates understanding of disease process, treatment plan, medications, and discharge instructions  Description: Complete learning assessment and assess knowledge base.Interventions:- Provide teaching at level of understanding- Provide teaching via preferred learning methods  Outcome: Completed     Problem: DISCHARGE PLANNING  Goal: Discharge to home or other facility with appropriate resources  Description: INTERVENTIONS:- Identify barriers to discharge w/patient and caregiver- Arrange for needed discharge resources and transportation as appropriate- Identify discharge learning needs (meds, wound care, etc.)- Arrange for interpretive services to assist at discharge as needed- Refer to Case Management Department for coordinating discharge planning if the patient needs post-hospital services based on physician/advanced practitioner order or complex needs related to functional status, cognitive ability, or social support system  Outcome: Completed

## 2025-04-17 NOTE — UTILIZATION REVIEW
"    MOTHER AND BABY DISCHARGED     NOTIFICATION OF INPATIENT ADMISSION   MATERNITY/DELIVERY AUTHORIZATION REQUEST   SERVICING FACILITY:   [facility] - L&D, , NICU  [address] [place] [address]  Tax ID: [id number]  NPI: [id number] ATTENDING PROVIDER:  Attending Name and NPI#: Antonio Saldana Md [[id number]]  Address[address] [facility] [address] [place] [place] [address]  Phone: [phone]     ADMISSION INFORMATION:  Place of Service: [facility]patient [facility]  Place of Service Code: 21  Inpatient Admission Date/Time: 4/15/25 11:22 AM  Discharge Date/Time: 2025  8:15 PM  Admitting Diagnosis Code/Description:  No admission diagnoses are documented for this encounter.     Mother: Bianca Hernández 2000 Estimated Date of Delivery: 25  Delivering clinician: Antonio Saldana   OB History          1    Para   1    Term   1       0    AB   0    Living   1         SAB   0    IAB   0    Ectopic   0    Multiple   0    Live Births   1               [place] Name & MRN:   Information for the patient's :  Felicia Hernández Boy (Bianca) [[id number]]    Delivery Information:  Sex: male  Delivered 4/15/2025 5:11 PM by Vaginal, Vacuum (Extractor); Gestational Age: 39w3d     Measurements:  Weight: 6 lb 14.8 oz (3140 g);  Height: 20\"    APGAR 1 minute 5 minutes 10 minutes   Totals: 8 9       UTILIZATION REVIEW CONTACT:  Liat Carlisle, Utilization   Network Utilization Review Department  Phone: [phone]  Fax [phone]  Email: [email or web address]  Contact for approvals/pending authorizations, clinical reviews, and discharge.     PHYSICIAN ADVISORY SERVICES:  Medical Necessity Denial & Hdtj-td-Giwl Review  Phone: [phone]  Fax: [phone]  Email: [email or web address]     DISCHARGE SUPPORT TEAM:  For Patients Discharge Needs & Updates  Phone: [phone] opt. 2 Fax: " [phone]  Email: [email or web address]     NOTIFICATION OF ADMISSION DISCHARGE   This is a Notification of Discharge from [facility]. Please be advised that this patient has been discharge from our facility. Below you will find the admission and discharge date and time including the patient’s disposition.   UTILIZATION REVIEW CONTACT:  Utilization Review Assistants  Network Utilization Review Department  Phone: [phone] x carefully listen to the prompts. All voicemails are confidential.  Email: [email or web address]     ADMISSION INFORMATION  PRESENTATION DATE: 4/15/2025 10:56 AM  OBERVATION ADMISSION DATE: N/A  INPATIENT ADMISSION DATE: 4/15/25 11:22 AM   DISCHARGE DATE: 4/16/2025  8:15 PM   DISPOSITION:Home/Self Care    [facility] Utilization Review Department  ATTENTION: Please call with any questions or concerns to [phone] and carefully listen to the prompts so that you are directed to the right person. All voicemails are confidential.   For Discharge needs, contact Care Management DC Support Team at [phone] opt. 2  Send all requests for admission clinical reviews, approved or denied determinations and any other requests to dedicated fax number below belonging to the campus where the patient is receiving treatment. List of dedicated fax numbers for the Facilities:  FACILITY NAME UR FAX NUMBER   ADMISSION DENIALS (Administrative/Medical Necessity) [phone]   DISCHARGE SUPPORT TEAM ([facility]) [phone]   PARENT CHILD HEALTH (Maternity/NICU/Pediatrics) [phone]   [facility] [phone]   [facility] [phone]   [facility] [phone]   [facility] [phone]   [facility] [phone]   [facility] [phone]   [facility] [phone]   [facility]  [facility] [phone]   [facility] [phone]   [facility] [phone]   [facility] [phone]   [facility] [phone]

## 2025-04-17 NOTE — TELEPHONE ENCOUNTER
LMOM  with location change (Dr Gabriel 11/19/25 [facility] Office to Dr Gabriel at the [facility]) due to her no longer going to the [facility] office, to call the office to confirm or r/s. When pt calls back schedule  appropriately, I do not have anything held.

## 2025-04-18 ENCOUNTER — TELEPHONE (OUTPATIENT)
Dept: OBGYN CLINIC | Facility: CLINIC | Age: 25
End: 2025-04-18

## 2025-04-21 ENCOUNTER — TELEPHONE (OUTPATIENT)
Dept: OBGYN CLINIC | Facility: CLINIC | Age: 25
End: 2025-04-21

## 2025-04-21 PROCEDURE — 88307 TISSUE EXAM BY PATHOLOGIST: CPT | Performed by: PATHOLOGY

## 2025-04-21 NOTE — TELEPHONE ENCOUNTER
POSTPARTUM PHONE CALL ASSESSMENT - left message patient's VM & MyChart message sent to patient 4/21/2025    Date of Delivery: 4/15/2025  Delivering Provider: Dr Antonio Saldana  Mode: VAVD  Delivery Notes/Complications: 2nd deg perineal lac, anemia (hgb = 9.5)  Do you still have bleeding/pain? If so, how much/how severe?     Regular BMs/Urination?     Breastfeeding/Formula/Both?     How are you doing emotionally?      Do you have any other questions or concerns for us or your provider?     Have you scheduled the pediatrician appointment with pediatrician?     Do you have a postpartum visit scheduled? no

## 2025-04-25 ENCOUNTER — OFFICE VISIT (OUTPATIENT)
Age: 25
End: 2025-04-25
Payer: COMMERCIAL

## 2025-04-25 PROCEDURE — 99404 PREV MED CNSL INDIV APPRX 60: CPT | Performed by: PEDIATRICS

## 2025-04-25 NOTE — PROGRESS NOTES
"INITIAL BREAST FEEDING EVALUATION    Informant/Relationship: Leslie/ mother    Discussion of General Lactation Issues: Leslie reports B/L nipple pain worse on the left which has improved over the past day. Leslie reports she feels Diogenes is \"chomping\" when latched onto the breast and nipple looks compressed after latch. FOB reports Diogenes occasionally snorts and pulls off the breast after what they feel is a deep latch. Father also reports clicking.     Diogenes is 10 old today.        History:  Fertility Problem:no  Breast changes:yes - Leaking colostrum for 3 months  : yes - Leslie's water broke during routine exam, Delivered via vacuum assist approx 5 hours later. Was concerned for fetal distress d/t being at the same station for extended time so used vacuum.    Full term:yes - 39 weeks and 3 days   labor:no  First nursing/attempt < 1 hour after birth:yes - Within first hour  Skin to skin following delivery:yes - Immediately  Breast changes after delivery:yes - Very full breasts, increased vessels, approx 3-4 days post delivery  Rooming in (infant in room with mother with exception of procedures, eg. Circumcision: yes - except for procedure  Blood sugar issues:no  NICU stay:no  Jaundice:no  Phototherapy:no  Supplement given: (list supplement and method used as well as reason(s):no    Past Medical History:   Diagnosis Date    ADHD          Current Outpatient Medications:     benzocaine-menthol-lanolin-aloe (DERMOPLAST) 20-0.5 % topical spray, Apply 1 Application topically 3 (three) times a day as needed for irritation or mild pain, Disp: 56 g, Rfl: 0    hydrocortisone 1 % cream, Apply 1 Application topically 4 (four) times a day as needed for rash or irritation, Disp: 14 g, Rfl: 0    ibuprofen (MOTRIN) 600 mg tablet, Take 1 tablet (600 mg total) by mouth every 6 (six) hours as needed for mild pain (First-line for mild pain.), Disp: 90 tablet, Rfl: 0    Prenatal MV-Min-Fe Fum-FA-DHA (PRENATAL+DHA PO), Take by " mouth, Disp: , Rfl:     witch hazel-glycerin (TUCKS) topical pad, Apply 1 Pad topically every 4 (four) hours as needed for irritation, Disp: 50 each, Rfl: 0    No Known Allergies    Social History     Substance and Sexual Activity   Drug Use Never       Social History     Interval Breastfeeding History:    Frequency of breast feeding: Every 2-3 hours  Does mother feel breastfeeding is effective: Yes  Does infant appear satisfied after nursing:Yes  Stooling pattern normal: No  Urinating frequently:No  Using shield or shells: No    Alternative/Artificial Feedings:   Bottle: Kim chelly- 1-2 times per day  Cup: No  Syringe/Finger: No           Formula Type: NA                     Amount: NA            Breast Milk:                      Amount: 2-3 oz            Frequency Q 2-3 Hr between feedings  Elimination Problems: No      Equipment:  Nipple Shield             Type: NA             Size: NA             Frequency of Use: NA  Pump            Type: Spectra S2            Frequency of Use: Once or twice per day, 2-3oz total   Shells            Type: NA            Frequency of use: NA    Equipment Problems: no    Mom:  Breast: Normal, full  Nipple Assessment in General: Normal: elongated/eraser, no discoloration and no damage noted.  Mother's Awareness of Feeding Cues                 Recognizes: Yes                  Verbalizes: Yes  Support System: FOB  History of Breastfeeding: This is Leslie's first time breastfeeding.  Changes/Stressors/Violence: Leslie is experiencing nipple pain with each feeding  Concerns/Goals: Leslie would like to exclusively breastfeed without pain and pump intermittently so Nelson can also offer bottles.     Problems with Mom: Nipple pain    Physical Exam  Pulmonary:      Effort: Pulmonary effort is normal.   Neurological:      General: No focal deficit present.      Mental Status: She is alert and oriented to person, place, and time.   Psychiatric:         Mood and Affect: Mood normal.          Behavior: Behavior normal.         Thought Content: Thought content normal.         Judgment: Judgment normal.       Infant:  Behaviors: Alert  Color: Pink  Birth weight: 3140 grams  Current weight: 3330 grams    Problems with infant: Has not mastered sucking      General Appearance:  Alert, active, no distress                             Head:  Normocephalic, AFOF, sutures opposed                             Eyes:  Conjunctiva clear, no drainage                              Ears:  Normally placed, no anomolies                             Nose:  Septum intact, no drainage or erythema                           Mouth:  No lesions, Diogenes sticks his tongue out well past his lower lip. He has a narrow gape and higher arched palate. He can lateralize bilaterally well but dimple noted in center of tongue while performing. Some peristalic movement felt while sucking on gloved finger with moderate cupping noted. He does not demonstrate any snapback, but loses seal and clicks frequently. Upon manual lift of the tongue, frenulum is noted to be short and attached posteriorly to the tip of the tongue and well below the lower alveolar ridge.                     Neck:  Supple, symmetrical, trachea midline, no adenopathy; thyroid: no enlargement, symmetric, no tenderness/mass/nodules.                 Respiratory:  No grunting, flaring, retractions, breath sounds clear and equal            Cardiovascular:  Regular rate and rhythm. No murmur. Adequate perfusion/capillary refill. Femoral pulse present                    Abdomen:   Soft, non-tender, no masses, bowel sounds present, no HSM             Genitourinary:  Normal male, testes descended, no discharge, swelling, or pain, anus patent                          Spine:   No abnormalities noted        Musculoskeletal:  Full range of motion          Skin/Hair/Nails:   Skin warm, dry, and intact, no rashes or abnormal dyspigmentation or lesions                Neurologic:   No abnormal  "movement, tone appropriate for gestational age  [facility] Assessment for Lingual Frenulum Function    Appearance Items Function Items   Appearance of tongue when lifted  1: Slight cleft in tip apparent   Lateralization  2: Complete   Elasticity of frenulum  1: Moderately elastic   Lift of tongue  2: Tip to mid-mouth     Length of lingual frenulum when tongue lifted  lingual frenulum length: 1: 1 cm     Extension of tongue  2: Tip over lower lip   Attachment of lingual frenulum to tongue  2: Posterior to tip   Spread of anterior tongue  1: Moderate of partial   Attachment of lingual frenulum to inferior alveolar ridge  2: Attached to floor of mouth or well below ridge Cupping  1: Side edges only, moderate cup   Ankyloglossia Grading:  Class I: mild, 12-16 mm  Class II: moderate, 8-11 mm  Class III: severe, 3-7 mm  ClassIV: complete, less than 3 mm Peristalsis  1: Partial, originating posterior to tip       SCORE:    Appearance: 7 (<8=ankyloglossia)  Function: 11 (<11=ankyloglossia) Snapback  2: None      [place] Latch:  Efficiency:               Lips Flanged: Yes              Depth of latch: Deep-After adjustment              Audible Swallow: Yes              Visible Milk: Yes              Wide Open/ Asymmetrical: Yes              Suck Swallow Cycle: Breathing: Unlabored, Coordinated: Yes  Nipple Assessment after latch: Normal: elongated/eraser, no discoloration and no damage noted.  Latch Problems: Positioning and shallow latches    Leslie positions Diogenes in cross-cradle hold on right breast. Leslie is moving breast to Diogenes and he is aligned mouth to nipple. He does not obtain latch and begins to get frustrated. Educated Leslie on bringing Diogenes to the breast with the chin leading and aligning Diogenes's upper lip to the nipple and using the opposite hand to offer a \"sandwich\". Diogenes obtained deep latch. Leslie noted some latch on tenderness but after a few moments reports pain is much better than it usually is. Good SSB " "coordination noted with audible swallows. Post feed weight taken and Diogenes took in 55mL from right breast and appeared content.     While getting dressed dad observed hunger cues and Diogenes was brought to the left breast. Leslie demonstrated good hold of the breast and brought Diogenes to the breast in good alignment in cross-cradle hold. He again obtained a deep latch with good SSB coordination. He fell asleep after a short period of time and unlatched himself.      Position:  Infant's Ergonomics/Body               Body Alignment: Yes               Head Supported: Yes               Close to Mom's body/ Lifted/ Supported: Yes               Mom's Ergonomics/Body: Yes                           Supported: Yes with my breast friend pillow                           Sitting Back: Yes, After adjustment                           Brings Baby to her breast: Yes, after adjustment  Positioning Problems: Leslie is bringing herself to Diogenes      Handouts:   None    Education:  Reviewed Latch: Reviewed how to observe for deep latch vs shallow latch and how to break latch to prevent compression of nipple.   Reviewed Positioning for Dyad: Reviewed bringing infant to breast with chin leading.   Reviewed Frequency/Supply & Demand: No concerns at this time.   Reviewed Infant:Cues and varied States of Awareness  Reviewed Infant Elimination: No concerns  Reviewed Alternative/Artificial Feedings: Reviewed paced bottle feeding  Reviewed Mom/Breast care: Reviewed non-lanolin based nipple balm.   Reviewed Equipment: No concerns with pump at this time.       Plan:  Plan for breastfeeding    Reassurance and support given.    Reviewed normal sucking patterns: transition from stimulation to nutritive to release or non-nutritive. Reviewed how to observe pattern.   Demonstrated \"baby-led\" latch  Demonstrated ways to hold breast to facilitate latch: simple lift or \"sandwich\" v \"taco\"  Discussed difference in sensation of non-nutritive v nutritive sucking. "   Discussed positioning allowing chin to lead and alignment of nipple to upper lip to ensure deep asymmetrical latch.   Reviewed paced bottle feeding.   Encouraged implementing tummy time into routine.   Will follow up to Diogenes continues to latch deeply and Leslie's pain improves.       I have spent 90 minutes with Patient and family today in which greater than 50% of this time was spent in counseling/coordination of care regarding Patient and family education, Documenting in the medical record, and Obtaining or reviewing history  .

## 2025-04-25 NOTE — PATIENT INSTRUCTIONS
"Continue to feed Diogenes on demand. He is growing great!   Work on positioning, remember to bring him to the breast allowing his chin to lead and aligning his upper lip with your nipple. Offer him a \"breast sandwich\" with your thumb aligned with his nose. When you observe him tilt his head back and open his mouth really wide, hug him onto the breast.     Shared with family World [employer] website: Attaching Your Baby at the Breast - Video - [employer]      If you obtain a latch that is shallow and/or painful, unlatch with pinky finger technique and retry. If Diogenes is crying or frustrated you may need to take a break and do some skin to skin to help him reset.     Utilize paced bottle feeding method as discussed. Here is a good video to watch for tips. Videos - [employer]      If you have any questions between now and your next appointment feel free to call the office or send a note in xAdhart.   "

## 2025-04-27 NOTE — PROGRESS NOTES
I have reviewed the notes, assessments, and/or procedures performed by Anaya Sepulveda RN, CLC I concur with her/his documentation of Bianca Holloway MD 04/27/25

## 2025-05-20 ENCOUNTER — POSTPARTUM VISIT (OUTPATIENT)
Dept: OBGYN CLINIC | Facility: CLINIC | Age: 25
End: 2025-05-20

## 2025-05-20 VITALS
WEIGHT: 122.6 LBS | BODY MASS INDEX: 20.43 KG/M2 | DIASTOLIC BLOOD PRESSURE: 76 MMHG | HEIGHT: 65 IN | SYSTOLIC BLOOD PRESSURE: 112 MMHG

## 2025-05-20 PROCEDURE — 99024 POSTOP FOLLOW-UP VISIT: CPT | Performed by: STUDENT IN AN ORGANIZED HEALTH CARE EDUCATION/TRAINING PROGRAM

## 2025-05-20 NOTE — PROGRESS NOTES
"Name: Bianca Hernández      : 2000      MRN: [id number]  Encounter Provider: Antonio Saldana MD  Encounter Date: 2025   Encounter department: [facility] [place]  :  Assessment & Plan  Postpartum care following vaginal delivery  - Normal postpartum exam  - Contraception: condoms  - Depression Screen: Low-risk  - Feeding: Breast  - Cervical cancer screening Up to Date, next due 10/2025.  - Referred to Physical Therapy for routine postpartum evaluation and treatment.  - Follow up in 3 months for annual exam or as needed.  Orders:  •  Ambulatory Referral to Physical Therapy; Future        History of Present Illness   HPI  Bianca Hernández is a 24 y.o. female who presents for routine postpartum follow up. She is 5 weeks s/p vacuum-assisted vaginal delivery on 4/15/2025 at 39w3d. Her postpartum course has been uncomplicated. She reports light lochia. Denies issues with bowel/bladder.   History obtained from: patient    Postpartum Depression: Low Risk  (2025)    [place]  Depression Scale    • Last EPDS Total Score: 0    • Last EPDS Self Harm Result: Never        Review of Systems   All other systems reviewed and are negative.    Medical History Reviewed by provider this encounter:  Tobacco  Med Hx  Surg Hx  Fam Hx  Soc Hx    .     Objective   /76 (BP Location: Left arm, Patient Position: Sitting, Cuff Size: Standard)   Ht 5' 5\" (1.651 m)   Wt 55.6 kg (122 lb 9.6 oz)   LMP 2024 (Exact Date)   Breastfeeding Yes   BMI 20.40 kg/m²      Physical Exam  Vitals reviewed. Exam conducted with a chaperone present (Rona Rg MA).   Constitutional:       Appearance: Normal appearance.     Cardiovascular:      Rate and Rhythm: Normal rate.   Pulmonary:      Effort: Pulmonary effort is normal.   Genitourinary:     Exam position: Lithotomy position.      Labia:         Right: No rash or tenderness.         Left: No rash or tenderness.       Vagina: Normal. No " vaginal discharge, erythema, tenderness, bleeding, lesions or prolapsed vaginal walls.      Cervix: Normal. No cervical motion tenderness, discharge, friability, lesion, erythema or cervical bleeding.      Uterus: Normal.       Adnexa: Right adnexa normal and left adnexa normal.        Right: No mass, tenderness or fullness.          Left: No mass, tenderness or fullness.        Comments: Obstetric laceration well-healing. Thin lochia present.    Neurological:      General: No focal deficit present.      Mental Status: She is alert and oriented to person, place, and time.     Psychiatric:         Mood and Affect: Mood normal.         Behavior: Behavior normal.         Thought Content: Thought content normal.         Judgment: Judgment normal.